# Patient Record
Sex: FEMALE | Race: ASIAN | NOT HISPANIC OR LATINO | Employment: OTHER | URBAN - METROPOLITAN AREA
[De-identification: names, ages, dates, MRNs, and addresses within clinical notes are randomized per-mention and may not be internally consistent; named-entity substitution may affect disease eponyms.]

---

## 2017-06-05 ENCOUNTER — OFFICE VISIT (OUTPATIENT)
Dept: FAMILY MEDICINE | Facility: CLINIC | Age: 72
End: 2017-06-05
Payer: COMMERCIAL

## 2017-06-05 DIAGNOSIS — R53.83 FATIGUE, UNSPECIFIED TYPE: ICD-10-CM

## 2017-06-05 DIAGNOSIS — M62.81 GENERALIZED MUSCLE WEAKNESS: ICD-10-CM

## 2017-06-05 DIAGNOSIS — Z13.6 SCREENING, ISCHEMIC HEART DISEASE: ICD-10-CM

## 2017-06-05 DIAGNOSIS — Z00.00 ROUTINE GENERAL MEDICAL EXAMINATION AT A HEALTH CARE FACILITY: Primary | ICD-10-CM

## 2017-06-05 DIAGNOSIS — E55.9 VITAMIN D DEFICIENCY: ICD-10-CM

## 2017-06-05 LAB
ERYTHROCYTE [DISTWIDTH] IN BLOOD BY AUTOMATED COUNT: 11.8 % (ref 10–15)
HBA1C MFR BLD: 5.9 % (ref 4.3–6)
HCT VFR BLD AUTO: 41.6 % (ref 35–47)
HGB BLD-MCNC: 13.7 G/DL (ref 11.7–15.7)
MCH RBC QN AUTO: 31.1 PG (ref 26.5–33)
MCHC RBC AUTO-ENTMCNC: 32.9 G/DL (ref 31.5–36.5)
MCV RBC AUTO: 95 FL (ref 78–100)
PLATELET # BLD AUTO: 231 10E9/L (ref 150–450)
RBC # BLD AUTO: 4.4 10E12/L (ref 3.8–5.2)
WBC # BLD AUTO: 8 10E9/L (ref 4–11)

## 2017-06-05 PROCEDURE — 85027 COMPLETE CBC AUTOMATED: CPT | Performed by: FAMILY MEDICINE

## 2017-06-05 PROCEDURE — 84443 ASSAY THYROID STIM HORMONE: CPT | Performed by: FAMILY MEDICINE

## 2017-06-05 PROCEDURE — 83036 HEMOGLOBIN GLYCOSYLATED A1C: CPT | Performed by: FAMILY MEDICINE

## 2017-06-05 PROCEDURE — 93000 ELECTROCARDIOGRAM COMPLETE: CPT | Performed by: FAMILY MEDICINE

## 2017-06-05 PROCEDURE — 82306 VITAMIN D 25 HYDROXY: CPT | Performed by: FAMILY MEDICINE

## 2017-06-05 PROCEDURE — 36415 COLL VENOUS BLD VENIPUNCTURE: CPT | Performed by: FAMILY MEDICINE

## 2017-06-05 PROCEDURE — 99213 OFFICE O/P EST LOW 20 MIN: CPT | Mod: 25 | Performed by: FAMILY MEDICINE

## 2017-06-05 PROCEDURE — 80061 LIPID PANEL: CPT | Performed by: FAMILY MEDICINE

## 2017-06-05 PROCEDURE — 80053 COMPREHEN METABOLIC PANEL: CPT | Performed by: FAMILY MEDICINE

## 2017-06-05 PROCEDURE — 84439 ASSAY OF FREE THYROXINE: CPT | Performed by: FAMILY MEDICINE

## 2017-06-05 PROCEDURE — 99387 INIT PM E/M NEW PAT 65+ YRS: CPT | Performed by: FAMILY MEDICINE

## 2017-06-05 RX ORDER — HYDROCODONE BITARTRATE AND ACETAMINOPHEN 5; 325 MG/1; MG/1
TABLET ORAL
Refills: 0 | COMMUNITY
Start: 2017-04-21 | End: 2021-11-01

## 2017-06-05 RX ORDER — LORATADINE 10 MG/1
10 TABLET ORAL DAILY
COMMUNITY
End: 2021-11-01

## 2017-06-05 RX ORDER — IBUPROFEN 600 MG/1
600 TABLET, FILM COATED ORAL EVERY 6 HOURS PRN
COMMUNITY
End: 2021-11-01

## 2017-06-05 NOTE — LETTER
[facility]   [address], [place]  [address]  [phone]      June 9, 2017      Rosalie Carreno  [address] [place] [address]              Dear Ms. Carreno,    The blood test results are normal for thyroid, blood cell counts, electrolytes (sodium, potassium, calcium), liver function, kidney function, and your cholesterol.     The A1c test for diabetes is mildly elevated, which means that you are pre-diabetic and have an increased risk of developing diabetes in the future.  This blood test should be repeated within one year to monitor.     Your vitamin D level was quite low; this can cause a variety of symptoms, including fatigue, muscle and joint aches and weakness.  I am not sure whether this is the only cause of the symptoms you have been having, but it would make sense to replace the vitamin D and see if your symptoms improve.  I have sent in a high dose vitamin D supplement which is taken only once per week for 8 weeks.  After that time, I would suggest coming back in to the clinic to recheck your symptoms and repeat the vitamin D blood test.     Results for orders placed or performed in visit on 06/05/17   Comprehensive metabolic panel   Result Value Ref Range    Sodium 141 133 - 144 mmol/L    Potassium 3.9 3.4 - 5.3 mmol/L    Chloride 108 94 - 109 mmol/L    Carbon Dioxide 23 20 - 32 mmol/L    Anion Gap 10 3 - 14 mmol/L    Glucose 108 (H) 70 - 99 mg/dL    Urea Nitrogen 21 7 - 30 mg/dL    Creatinine 0.70 0.52 - 1.04 mg/dL    GFR Estimate 82 >60 mL/min/1.7m2    GFR Estimate If Black >90   GFR Calc   >60 mL/min/1.7m2    Calcium 8.9 8.5 - 10.1 mg/dL    Bilirubin Total 0.3 0.2 - 1.3 mg/dL    Albumin 3.8 3.4 - 5.0 g/dL    Protein Total 7.4 6.8 - 8.8 g/dL    Alkaline Phosphatase 82 40 - 150 U/L    ALT 36 0 - 50 U/L    AST 23 0 - 45 U/L   Hemoglobin A1c   Result Value Ref Range    Hemoglobin A1C 5.9 4.3 - 6.0 %   Lipid Profile   Result Value Ref Range    Cholesterol 172  <200 mg/dL    Triglycerides 113 <150 mg/dL    HDL Cholesterol 59 >49 mg/dL    LDL Cholesterol Calculated 90 <100 mg/dL    Non HDL Cholesterol 113 <130 mg/dL   CBC with platelets   Result Value Ref Range    WBC 8.0 4.0 - 11.0 10e9/L    RBC Count 4.40 3.8 - 5.2 10e12/L    Hemoglobin 13.7 11.7 - 15.7 g/dL    Hematocrit 41.6 35.0 - 47.0 %    MCV 95 78 - 100 fl    MCH 31.1 26.5 - 33.0 pg    MCHC 32.9 31.5 - 36.5 g/dL    RDW 11.8 10.0 - 15.0 %    Platelet Count 231 150 - 450 10e9/L   TSH   Result Value Ref Range    TSH 0.82 0.40 - 4.00 mU/L   T4, free   Result Value Ref Range    T4 Free 0.87 0.76 - 1.46 ng/dL   Vitamin D Deficiency   Result Value Ref Range    Vitamin D Deficiency screening 13 (L) 20 - 75 ug/L           Sincerely,    Annabel Russell MD/yony

## 2017-06-05 NOTE — PROGRESS NOTES
SUBJECTIVE:                                                            Rosalie Carreno is a 72 year old female who presents for Preventive Visit.  Are you in the first 12 months of your Medicare coverage?  No    Physical   Annual:     Getting at least 3 servings of Calcium per day::  Yes    Bi-annual eye exam::  NO    Dental care twice a year::  NO    Sleep apnea or symptoms of sleep apnea::  Daytime drowsiness    Diet::  Regular (no restrictions)    Frequency of exercise::  None    Taking medications regularly::  No    Barriers to taking medications::  Side effects    Medication side effects::  Muscle aches and Lightheadedness    Additional concerns today::  YES (Medication side effects: fatigue)    The patient is here with her granddaughter and an .  She would like a physical, because she has been feeling very fatigued lately and generally weak.  She denies any past medical history other than h/o c section, h/o shoulder and knee pain, and a recent carpal tunnel surgery one month ago.  She lives alone and still drives.  She feels that she is forgetting people's names more often, but no confusion or other worrisome memory loss.  She reports poor sleep.  She sometimes takes naps.  In 2012, she had some episodes of weakness that were profound.  She says she was at the grocery store when she felt very weak on both sides of her body and had to call a family member to come and get her, because she did not feel she could safely drive.  This episode lasted about 2 hours.  She did not seek medical attention.      Shoulder and knee pain: bilaterally, has had steroid injections with orthopedics which does help.  The medication that she takes makes her feel tired.  I think that she is referring to ibuprofen here; norco is on her list, but I think this was just given to her post-op for carpal tunnel, and she says she only took it a few times.      CV: no h/o heart disease, no chest pain or palpitations, no  edema.  Malignancy: one son had liver cancer, another son had skin cancer.  She had a mammogram two years ago and is due for repeat  She doesn't know about colon cancer screening.  She has been a non-smoker.    Bone health: not sure if she has had dexa scan  Immunizations:  tdap done 2009 p[er [facility].  Pneumovax done 2009; I don't see a PCV13.or zoster vaccination. I will need to get her records.        COGNITIVE SCREEN  1) Repeat 3 items (Banana, Sunrise, Chair)    2) Clock draw: ABNORMAL   3) 3 item recall: Recalls 1 object   Results: ABNORMAL clock, 1-2 items recalled: PROBABLE COGNITIVE IMPAIRMENT, **INFORM PROVIDER**    Mini-CogTM Copyright RODNEY Manuel. Licensed by the author for use in [facility] Online Dealer; reprinted with permission ([email or web address]@[email or web address]). All rights reserved.        Reviewed and updated as needed this visit by clinical staff  Tobacco  Allergies  Meds  Med Hx  Surg Hx  Fam Hx  Soc Hx        Reviewed and updated as needed this visit by Provider        Social History   Substance Use Topics     Smoking status: Never Smoker     Smokeless tobacco: Not on file     Alcohol use No       The patient does not drink >3 drinks per day nor >7 drinks per week.        Today's PHQ-2 Score:   PHQ-2 ( 1999 [employer]) 6/5/2017   Q1: Little interest or pleasure in doing things 0   Q2: Feeling down, depressed or hopeless 0   PHQ-2 Score 0   Q1: Little interest or pleasure in doing things Not at all   Q2: Feeling down, depressed or hopeless Not at all   PHQ-2 Score 0       Do you feel safe in your environment - Yes    Do you have a Health Care Directive?: No: Advance care planning was reviewed with patient; patient declined at this time.    Current providers sharing in care for this patient include:   No care team member to display      Hearing impairment: No    Ability to successfully perform activities of daily living: No, needs assistance with: transportation and housework     Fall risk:  Fallen 2 or more times in the  "past year?: No  Any fall with injury in the past year?: No    Home safety:  none identified      The following health maintenance items are reviewed in Epic and correct as of today:  Health Maintenance   Topic Date Due     TETANUS IMMUNIZATION (SYSTEM ASSIGNED)  04/20/1961     ADVANCE DIRECTIVE PLANNING Q5 YRS  04/20/1961     MAMMO SCREEN Q2 YR (SYSTEM ASSIGNED)  04/20/1983     LIPID SCREEN Q5 YR FEMALE (SYSTEM ASSIGNED)  04/20/1988     COLON CANCER SCREEN (SYSTEM ASSIGNED)  04/20/1993     FALL RISK ASSESSMENT  04/20/2008     DEXA SCAN SCREENING (SYSTEM ASSIGNED)  04/20/2008     PNEUMOCOCCAL (1 of 2 - PCV13) 04/20/2008     INFLUENZA VACCINE (SYSTEM ASSIGNED)  09/01/2017              ROS:  Constitutional, HEENT, cardiovascular, pulmonary, GI, , musculoskeletal, neuro, skin, endocrine and psych systems are negative, except as otherwise noted.    There is no problem list on file for this patient.    History reviewed. No pertinent surgical history.    Social History   Substance Use Topics     Smoking status: Never Smoker     Smokeless tobacco: Not on file     Alcohol use No     History reviewed. No pertinent family history.      Current Outpatient Prescriptions   Medication Sig Dispense Refill     ibuprofen (ADVIL/MOTRIN) 600 MG tablet Take 600 mg by mouth every 6 hours as needed for moderate pain       loratadine (CLARITIN) 10 MG tablet Take 10 mg by mouth daily       HYDROcodone-acetaminophen (NORCO) 5-325 MG per tablet TK 1-2 T PO Q 4-6 H PRN  0     No Known Allergies  OBJECTIVE:                                                            /62  Temp 96.5  F (35.8  C) (Tympanic)  Resp 16  Ht 4' 8\" (1.422 m)  Wt 114 lb 9.6 oz (52 kg)  SpO2 97%  BMI 25.69 kg/m2 There is no height or weight on file to calculate BMI.  EXAM:   GENERAL APPEARANCE: healthy, alert and no distress  EYES: Eyes grossly normal to inspection, PERRL and conjunctivae and sclerae normal  HENT: ear canals and TM's normal, nose and mouth " without ulcers or lesions, oropharynx clear and oral mucous membranes moist  NECK: no adenopathy, no asymmetry, masses, or scars and thyroid normal to palpation  RESP: lungs clear to auscultation - no rales, rhonchi or wheezes  BREAST: normal without masses, tenderness or nipple discharge and no palpable axillary masses or adenopathy  CV: regular rate and rhythm, normal S1 S2, no S3 or S4, no murmur, click or rub, no peripheral edema and peripheral pulses strong  ABDOMEN: soft, nontender, no hepatosplenomegaly, no masses and bowel sounds normal  MS: no musculoskeletal defects are noted and gait is age appropriate without ataxia  SKIN: no suspicious lesions or rashes  NEURO: Normal strength and tone, sensory exam grossly normal, mentation intact and speech normal  PSYCH: mentation appears normal and affect normal/bright    ASSESSMENT / PLAN:                                                            1. Well adult    - Lipid Profile    CV: lipids and A1c, EKG a little abnormal as above, could consider further evaluation with an echocardiogram and/or stress testing.   Malignancy: she will schedule her mammogram.  I will check her records regarding colon cancer screening.   Immunizations: I will review her records for PCV 13 and zoster.  Bone health: I will review her records for previous dexa scan.     2.  Fatigue, unspecified type  The differential is wide, of course, and the history she provided was somewhat vague.  She reports ongoing fatigue and a h/o episodes of generalized muscle weakness.  I will check routine labs, including her renal and liver function tests, her blood counts, her thyroid tests, and an A1c to evaluate for diabetes.  She reports poor sleep, and I think there may be some cognitive impairment underlying by her abnormal clock drawing and her own report of having trouble with names.  She reports poor sleep, so of course insomnia would cause daytime fatigue.  Heart disease may be underlying this as  well, so I did get an EKG which showed NSR, no pathologic Q waves, but some T wave inversion in V1, V2.    - Comprehensive metabolic panel  - Hemoglobin A1c  - CBC with platelets  - TSH  - T4, free  - EKG 12-lead complete w/read - Clinics    3. Generalized muscle weakness  As above; she describes having discrete episodes of severe weakness, but this was five years ago.  Given that it was symmetric, I do not suspect TIA or stroke.    - Comprehensive metabolic panel  - Hemoglobin A1c  - CBC with platelets  - TSH  - T4, free  -Vit D  - EKG 12-lead complete w/read - Clinics      End of Life Planning:  Patient currently has an advanced directive:we did not have time to address this today.   COUNSELING:  As above.        There is no height or weight on file to calculate BMI.     reports that she has never smoked. She does not have any smokeless tobacco history on file.      Appropriate preventive services were discussed with this patient, including applicable screening as appropriate for cardiovascular disease, diabetes, osteopenia/osteoporosis, and glaucoma.  As appropriate for age/gender, discussed screening for colorectal cancer, prostate cancer, breast cancer, and cervical cancer. Checklist reviewing preventive services available has been given to the patient.    Reviewed patients plan of care and provided an AVS. The Basic Care Plan (routine screening as documented in Health Maintenance) for Rosalie meets the Care Plan requirement. This Care Plan has been established and reviewed with the Patient and other:granddaughter.    Counseling Resources:  ATP IV Guidelines  Pooled Cohorts Equation Calculator  Breast Cancer Risk Calculator  FRAX Risk Assessment  ICSI Preventive Guidelines  Dietary Guidelines for Americans, 2010  [employer] MyPlate  ASA Prophylaxis  Lung CA Screening    Annabel Russell MD  [facility]  Answers for HPI/ROS submitted by the patient on 6/5/2017   PHQ-2 Score: 0

## 2017-06-05 NOTE — NURSING NOTE
"Chief Complaint   Patient presents with     Medicare Visit       Initial /62  Temp 96.5  F (35.8  C) (Tympanic)  Resp 16  Ht 4' 8\" (1.422 m)  Wt 114 lb 9.6 oz (52 kg)  SpO2 97%  BMI 25.69 kg/m2 Estimated body mass index is 25.69 kg/(m^2) as calculated from the following:    Height as of this encounter: 4' 8\" (1.422 m).    Weight as of this encounter: 114 lb 9.6 oz (52 kg).  Medication Reconciliation: unable or not appropriate to perform         Shahriar Byrne MA      "

## 2017-06-05 NOTE — MR AVS SNAPSHOT
After Visit Summary   6/5/2017    Rosalie Carreno    MRN: [id number]           Patient Information     Date Of Birth          1945        Visit Information        Provider Department      6/5/2017 1:30 PM Annabel Russell MD; [employer] [facility]        Today's Diagnoses     Fatigue, unspecified type    -  1    Routine general medical examination at a health care facility        Generalized muscle weakness        Screening, ischemic heart disease          Care Instructions      Preventive Health Recommendations    Female Ages 65 +    Yearly exam:     See your health care provider every year in order to  o Review health changes.   o Discuss preventive care.    o Review your medicines if your doctor has prescribed any.      You no longer need a yearly Pap test unless you've had an abnormal Pap test in the past 10 years. If you have vaginal symptoms, such as bleeding or discharge, be sure to talk with your provider about a Pap test.      Every 1 to 2 years, have a mammogram.  If you are over 69, talk with your health care provider about whether or not you want to continue having screening mammograms.      Every 10 years, have a colonoscopy. Or, have a yearly FIT test (stool test). These exams will check for colon cancer.       Have a cholesterol test every 5 years, or more often if your doctor advises it.       Have a diabetes test (fasting glucose) every three years. If you are at risk for diabetes, you should have this test more often.       At age 65, have a bone density scan (DEXA) to check for osteoporosis (brittle bone disease).    Shots:    Get a flu shot each year.    Get a tetanus shot every 10 years.    Talk to your doctor about your pneumonia vaccines. There are now two you should receive - Pneumovax (PPSV 23) and Prevnar (PCV 13).    Talk to your doctor about the shingles vaccine.    Talk to your doctor about the hepatitis B vaccine.    Nutrition:  "    Eat at least 5 servings of fruits and vegetables each day.      Eat whole-grain bread, whole-wheat pasta and brown rice instead of white grains and rice.      Talk to your provider about Calcium and Vitamin D.     Lifestyle    Exercise at least 150 minutes a week (30 minutes a day, 5 days a week). This will help you control your weight and prevent disease.      Limit alcohol to one drink per day.      No smoking.       Wear sunscreen to prevent skin cancer.       See your dentist twice a year for an exam and cleaning.      See your eye doctor every 1 to 2 years to screen for conditions such as glaucoma, macular degeneration and cataracts.          Follow-ups after your visit        Who to contact     If you have questions or need follow up information about today's clinic visit or your schedule please contact [facility] directly at [phone].  Normal or non-critical lab and imaging results will be communicated to you by Easy[employer], letter or phone within 4 business days after the clinic has received the results. If you do not hear from us within 7 days, please contact the clinic through [employer] or phone. If you have a critical or abnormal lab result, we will notify you by phone as soon as possible.  Submit refill requests through [employer] or call your pharmacy and they will forward the refill request to us. Please allow 3 business days for your refill to be completed.          Additional Information About Your Visit        [employer] Information     [employer] lets you send messages to your doctor, view your test results, renew your prescriptions, schedule appointments and more. To sign up, go to [email or web address] . Click on \"Log in\" on the left side of the screen, which will take you to the Welcome page. Then click on \"Sign up Now\" on the right side of the page.     You will be asked to enter the access code listed below, as well as some personal information. Please follow the directions to " "create your username and password.     Your access code is: [email or web address]  Expires: 9/3/2017  4:51 PM     Your access code will  in 90 days. If you need help or a new code, please call your [facility] or [phone].        Care EveryWhere ID     This is your Care EveryWhere ID. This could be used by other organizations to access your [facility] medical records  [id number]        Your Vitals Were     Temperature Respirations Height Pulse Oximetry BMI (Body Mass Index)       96.5  F (35.8  C) (Tympanic) 16 4' 8\" (1.422 m) 97% 25.69 kg/m2        Blood Pressure from Last 3 Encounters:   17 129/62    Weight from Last 3 Encounters:   17 114 lb 9.6 oz (52 kg)              We Performed the Following     CBC with platelets     Comprehensive metabolic panel     EKG 12-lead complete w/read - Clinics     Hemoglobin A1c     Lipid Profile     T4, free     TSH     Vitamin D Deficiency        Primary Care Provider    None Specified       No primary provider on file.        Thank you!     Thank you for choosing [facility]  for your care. Our goal is always to provide you with excellent care. Hearing back from our patients is one way we can continue to improve our services. Please take a few minutes to complete the written survey that you may receive in the mail after your visit with us. Thank you!             Your Updated Medication List - Protect others around you: Learn how to safely use, store and throw away your medicines at www.disposemymeds.org.          This list is accurate as of: 17  4:51 PM.  Always use your most recent med list.                   Brand Name Dispense Instructions for use    HYDROcodone-acetaminophen 5-325 MG per tablet    NORCO     TK 1-2 T PO Q 4-6 H PRN       ibuprofen 600 MG tablet    ADVIL/MOTRIN     Take 600 mg by mouth every 6 hours as needed for moderate pain       loratadine 10 MG tablet    CLARITIN     Take 10 mg by mouth daily         "

## 2017-06-06 VITALS
HEIGHT: 56 IN | HEART RATE: 134 BPM | OXYGEN SATURATION: 97 % | WEIGHT: 114.6 LBS | BODY MASS INDEX: 25.78 KG/M2 | DIASTOLIC BLOOD PRESSURE: 62 MMHG | RESPIRATION RATE: 16 BRPM | SYSTOLIC BLOOD PRESSURE: 129 MMHG | TEMPERATURE: 96.5 F

## 2017-06-06 LAB
ALBUMIN SERPL-MCNC: 3.8 G/DL (ref 3.4–5)
ALP SERPL-CCNC: 82 U/L (ref 40–150)
ALT SERPL W P-5'-P-CCNC: 36 U/L (ref 0–50)
ANION GAP SERPL CALCULATED.3IONS-SCNC: 10 MMOL/L (ref 3–14)
AST SERPL W P-5'-P-CCNC: 23 U/L (ref 0–45)
BILIRUB SERPL-MCNC: 0.3 MG/DL (ref 0.2–1.3)
BUN SERPL-MCNC: 21 MG/DL (ref 7–30)
CALCIUM SERPL-MCNC: 8.9 MG/DL (ref 8.5–10.1)
CHLORIDE SERPL-SCNC: 108 MMOL/L (ref 94–109)
CHOLEST SERPL-MCNC: 172 MG/DL
CO2 SERPL-SCNC: 23 MMOL/L (ref 20–32)
CREAT SERPL-MCNC: 0.7 MG/DL (ref 0.52–1.04)
DEPRECATED CALCIDIOL+CALCIFEROL SERPL-MC: 13 UG/L (ref 20–75)
GFR SERPL CREATININE-BSD FRML MDRD: 82 ML/MIN/1.7M2
GLUCOSE SERPL-MCNC: 108 MG/DL (ref 70–99)
HDLC SERPL-MCNC: 59 MG/DL
LDLC SERPL CALC-MCNC: 90 MG/DL
NONHDLC SERPL-MCNC: 113 MG/DL
POTASSIUM SERPL-SCNC: 3.9 MMOL/L (ref 3.4–5.3)
PROT SERPL-MCNC: 7.4 G/DL (ref 6.8–8.8)
SODIUM SERPL-SCNC: 141 MMOL/L (ref 133–144)
T4 FREE SERPL-MCNC: 0.87 NG/DL (ref 0.76–1.46)
TRIGL SERPL-MCNC: 113 MG/DL
TSH SERPL DL<=0.05 MIU/L-ACNC: 0.82 MU/L (ref 0.4–4)

## 2018-08-15 ENCOUNTER — RECORDS - HEALTHEAST (OUTPATIENT)
Dept: ADMINISTRATIVE | Facility: OTHER | Age: 73
End: 2018-08-15

## 2018-09-11 ENCOUNTER — COMMUNICATION - HEALTHEAST (OUTPATIENT)
Dept: VASCULAR SURGERY | Facility: CLINIC | Age: 73
End: 2018-09-11

## 2019-07-02 ENCOUNTER — OFFICE VISIT - HEALTHEAST (OUTPATIENT)
Dept: FAMILY MEDICINE | Facility: CLINIC | Age: 74
End: 2019-07-02

## 2019-07-02 DIAGNOSIS — R09.89 RHONCHUS: ICD-10-CM

## 2019-07-02 RX ORDER — CODEINE PHOSPHATE AND GUAIFENESIN 10; 100 MG/5ML; MG/5ML
SOLUTION ORAL
Refills: 0 | Status: SHIPPED | COMMUNITY
Start: 2018-10-16 | End: 2021-10-18

## 2019-07-02 RX ORDER — ALBUTEROL SULFATE 0.83 MG/ML
2.5 SOLUTION RESPIRATORY (INHALATION) EVERY 4 HOURS PRN
Qty: 30 VIAL | Refills: 1 | Status: SHIPPED | OUTPATIENT
Start: 2019-07-02 | End: 2021-10-18

## 2019-07-05 ENCOUNTER — OFFICE VISIT - HEALTHEAST (OUTPATIENT)
Dept: FAMILY MEDICINE | Facility: CLINIC | Age: 74
End: 2019-07-05

## 2019-07-05 DIAGNOSIS — R05.9 COUGH: ICD-10-CM

## 2019-07-05 ASSESSMENT — MIFFLIN-ST. JEOR: SCORE: 865.99

## 2021-05-30 NOTE — PROGRESS NOTES
"Assessment/Plan:        Problem List Items Addressed This Visit     None      Visit Diagnoses     Cough    -  Primary      Doing well on the current plan  Continue with the nebs as needed       Follow-up PRN      Subjective:    Patient ID:   Rosalie Carreno is a 74 y.o. female comes in follow-up to walk-in care seen on 2019 for a cough.  She had a negative chest x-ray and was treated with prednisone and albuterol nebs.  She reports to doing better and having no other issues or concerns      Review of Systems  Allergy: reviewed  General : negative  A complete 5 point review of systems was obtained and is negative other than what is stated in the HPI.       The following patient's history were reviewed and updated as appropriate:   She  has no past medical history on file..      Outpatient Encounter Medications as of 2019   Medication Sig Dispense Refill     8 HOUR PAIN RELIEVER 650 mg CR tablet Take 650 mg by mouth 3 (three) times a day as needed.  0     albuterol (PROVENTIL) 2.5 mg /3 mL (0.083 %) nebulizer solution Take 3 mL (2.5 mg total) by nebulization every 4 (four) hours as needed for wheezing. 30 vial 1     codeine-guaiFENesin (GUAIFENESIN AC)  mg/5 mL liquid TAKE 5 ML BY MOUTH EVERY 8 HOURS AS NEEDED  0     [] predniSONE (DELTASONE) 20 MG tablet Take 40 mg by mouth daily for 5 days. 10 tablet 0     No facility-administered encounter medications on file as of 2019.          Objective:   /60 (Patient Site: Right Arm, Patient Position: Sitting, Cuff Size: Adult Regular)   Pulse 69   Ht 4' 9\" (1.448 m)   Wt 112 lb 14.4 oz (51.2 kg)   SpO2 95%   BMI 24.43 kg/m        Physical Exam    General appearance - alert, well appearing, and in no distress  Neck - supple, no significant adenopathy, no palpable mass  Chest - clear to auscultation, no wheezes, rales or rhonchi, symmetric air entry  Heart - normal rate, regular rhythm, normal S1, S2, no murmurs, rubs, clicks or gallops  Back " exam -  non tenderness, no palpable spasm or pain on motion  Skin - normal coloration and turgor, no rashes, no suspicious skin lesions noted

## 2021-05-30 NOTE — PROGRESS NOTES
Chief Complaint   Patient presents with     Cough     dry cough, x3 days per daughter of pt, hoarse voice, been taking some cough syrup with codiene that makes her dizzy        HPI:  Rosalie Carreno is a 74 y.o. female who presents today complaining of dry cough, fatigue, and dizziness for the past 3 days. She is also having voice hoarseness. She has been taking cough syrup with codeine. Upon the rooming process the MA noted an abnormal breathing pattern like a jerking chest motion. The movement is intermittent. The MA asked the patient's daughter if the pattern has been noted before, and the daughter states that she just noticed it for the first time now that it's been pointed out.     History obtained from the patient.    Problem List:  Cervical Spine Stenosis      No past medical history on file.    Social History     Tobacco Use     Smoking status: Never Smoker     Smokeless tobacco: Never Used   Substance Use Topics     Alcohol use: Not on file       Review of Systems   HENT: Positive for voice change (hoarse).    Respiratory: Positive for cough and wheezing.        Vitals:    07/02/19 0732   BP: 115/71   Patient Site: Right Arm   Patient Position: Sitting   Cuff Size: Adult Regular   Pulse: 70   Resp: 15   Temp: 98.2  F (36.8  C)   TempSrc: Oral   SpO2: 96%   Weight: 114 lb 4 oz (51.8 kg)       Physical Exam   Constitutional: She appears well-developed and well-nourished. No distress.   HENT:   Head: Normocephalic and atraumatic.   Right Ear: External ear normal.   Left Ear: External ear normal.   Eyes: Conjunctivae are normal. Right eye exhibits no discharge. Left eye exhibits no discharge.   Cardiovascular: Normal rate, regular rhythm and normal heart sounds.   Pulmonary/Chest: No accessory muscle usage. No tachypnea. She has wheezes. She has rhonchi (expiratory) in the right upper field and the left upper field.   Jerking chest movement that occurs intermittently. Could possibly be a grunting, or excess effort  to .    Skin: She is not diaphoretic.   Psychiatric: She has a normal mood and affect. Her behavior is normal. Judgment and thought content normal.         Radiology:  I have personally ordered and preliminarily reviewed the following xray, I have noted no signs of consolidation.  Xr Chest 2 Views    Result Date: 2019  EXAM DATE:         2019 EXAM: X-RAY CHEST, 2 VIEWS, FRONTAL AND LATERAL LOCATION: [facility] DATE/TIME: 2019 7:45 AM INDICATION: expiratory rhonchi bilateral upper lung fields. Abnormal breathing pattern COMPARISON: 2009. FINDINGS: The lungs are clear of focal consolidation. No pneumothorax or pleural effusion. Heart size is upper normal. Pulmonary vascularity is normal.       Clinical Decision Making:  Experiencing expiratory rhonchus and wheeze.  Chest x-ray was clear for any signs of pneumonia.  Patient has no known history for tobacco use, but she improved significantly with DuoNeb here in the clinic.  After the DuoNeb her breathing effort was improved.  May have component of reactive airway disease.  Recommend that we start on albuterol and prednisone with close follow-up with primary care.  Patient's vitals remained stable even with walking test.  At the end of the encounter, I discussed results, diagnosis, medications. Discussed red flags for immediate return to clinic/ER, as well as indications for follow up if no improvement. Patient understood and agreed to plan. Patient was stable for discharge.    1. Rhonchus  XR Chest 2 Views    ipratropium-albuterol 0.5-2.5 mg/3 mL nebulizer solution 3 mL (DUO-NEB)         Patient Instructions   1. Discontinue the codeine cough syrup. Begin taking Delsym and Tessalon Perles.   2. Increase fluids and rest.   3. Please monitor symptoms carefully.  If developing chest pain, shortness of breath, fever, coughing up blood, extreme fatigue, or any other new, concerning symptoms, come back to clinic or go to ER  immediately.  Otherwise, if no improvement in symptoms in one week, follow-up with your primary care provider.

## 2021-05-30 NOTE — PATIENT INSTRUCTIONS - HE
1. Discontinue the codeine cough syrup. Begin taking Prednisone and use breathing treatments every 6 hours for the next 3 days.   2. Increase fluids and rest.   3. Please monitor symptoms carefully.  If developing chest pain, worsening shortness of breath, fever, coughing up blood, extreme fatigue, or any other new, concerning symptoms, come back to clinic or go to ER immediately.  Otherwise, if no improvement in symptoms in one week, follow-up with your primary care provider.

## 2021-06-01 ENCOUNTER — RECORDS - HEALTHEAST (OUTPATIENT)
Dept: ADMINISTRATIVE | Facility: CLINIC | Age: 76
End: 2021-06-01

## 2021-06-03 VITALS — WEIGHT: 112.9 LBS | BODY MASS INDEX: 24.36 KG/M2 | HEIGHT: 57 IN

## 2021-06-03 VITALS — WEIGHT: 114.25 LBS

## 2021-06-04 ENCOUNTER — RECORDS - HEALTHEAST (OUTPATIENT)
Dept: ADMINISTRATIVE | Facility: CLINIC | Age: 76
End: 2021-06-04

## 2021-08-22 ENCOUNTER — HEALTH MAINTENANCE LETTER (OUTPATIENT)
Age: 76
End: 2021-08-22

## 2021-10-17 ENCOUNTER — HEALTH MAINTENANCE LETTER (OUTPATIENT)
Age: 76
End: 2021-10-17

## 2021-10-18 ENCOUNTER — OFFICE VISIT (OUTPATIENT)
Dept: FAMILY MEDICINE | Facility: CLINIC | Age: 76
End: 2021-10-18
Payer: COMMERCIAL

## 2021-10-18 VITALS
OXYGEN SATURATION: 96 % | SYSTOLIC BLOOD PRESSURE: 135 MMHG | BODY MASS INDEX: 25.46 KG/M2 | TEMPERATURE: 97.6 F | DIASTOLIC BLOOD PRESSURE: 75 MMHG | WEIGHT: 118 LBS | HEIGHT: 57 IN | RESPIRATION RATE: 22 BRPM | HEART RATE: 74 BPM

## 2021-10-18 DIAGNOSIS — Z11.59 NEED FOR HEPATITIS C SCREENING TEST: ICD-10-CM

## 2021-10-18 DIAGNOSIS — E66.3 OVERWEIGHT (BMI 25.0-29.9): ICD-10-CM

## 2021-10-18 DIAGNOSIS — Z23 NEED FOR PROPHYLACTIC VACCINATION AND INOCULATION AGAINST INFLUENZA: ICD-10-CM

## 2021-10-18 DIAGNOSIS — Z13.220 LIPID SCREENING: ICD-10-CM

## 2021-10-18 DIAGNOSIS — Z76.89 ENCOUNTER TO ESTABLISH CARE: Primary | ICD-10-CM

## 2021-10-18 LAB
ANION GAP SERPL CALCULATED.3IONS-SCNC: 12 MMOL/L (ref 5–18)
BUN SERPL-MCNC: 14 MG/DL (ref 8–28)
CALCIUM SERPL-MCNC: 9.7 MG/DL (ref 8.5–10.5)
CHLORIDE BLD-SCNC: 106 MMOL/L (ref 98–107)
CHOLEST SERPL-MCNC: 193 MG/DL
CO2 SERPL-SCNC: 24 MMOL/L (ref 22–31)
CREAT SERPL-MCNC: 0.68 MG/DL (ref 0.6–1.1)
FASTING STATUS PATIENT QL REPORTED: YES
GFR SERPL CREATININE-BSD FRML MDRD: 84 ML/MIN/1.73M2
GLUCOSE BLD-MCNC: 96 MG/DL (ref 70–125)
HDLC SERPL-MCNC: 65 MG/DL
LDLC SERPL CALC-MCNC: 111 MG/DL
POTASSIUM BLD-SCNC: 3.8 MMOL/L (ref 3.5–5)
SODIUM SERPL-SCNC: 142 MMOL/L (ref 136–145)
TRIGL SERPL-MCNC: 85 MG/DL

## 2021-10-18 PROCEDURE — 90662 IIV NO PRSV INCREASED AG IM: CPT | Performed by: FAMILY MEDICINE

## 2021-10-18 PROCEDURE — 80048 BASIC METABOLIC PNL TOTAL CA: CPT | Performed by: FAMILY MEDICINE

## 2021-10-18 PROCEDURE — 80061 LIPID PANEL: CPT | Performed by: FAMILY MEDICINE

## 2021-10-18 PROCEDURE — G0008 ADMIN INFLUENZA VIRUS VAC: HCPCS | Performed by: FAMILY MEDICINE

## 2021-10-18 PROCEDURE — 36415 COLL VENOUS BLD VENIPUNCTURE: CPT | Performed by: FAMILY MEDICINE

## 2021-10-18 PROCEDURE — 99204 OFFICE O/P NEW MOD 45 MIN: CPT | Mod: 25 | Performed by: FAMILY MEDICINE

## 2021-10-18 PROCEDURE — 86803 HEPATITIS C AB TEST: CPT | Performed by: FAMILY MEDICINE

## 2021-10-18 ASSESSMENT — MIFFLIN-ST. JEOR: SCORE: 890.5

## 2021-10-18 NOTE — PROGRESS NOTES
"CC: New patient       HPI:   - No medical conditions and no current medications that she takes   - Only complaints are back aches and muscle aches in her arms that are chronic issues   - Post carpal tunnel surgery, shares that her normal range of motion is more limited after surgery, and also endorses some numbness or tingling     Rosalie Carreno is accompanied by her daughter.    [employer]  was used for this visit    Past Medical History   - No PMH   - Received care from Dr. Kingsley Mata ([facility]), but has not been seen for 2 years  - No history of hospitalizations     Medications  - Not currently on any medications     Family History  - No family history of diabetes     Surgical History  - R knee replacement (2019)   - Surgery for Carpal Tunnel (2019)     Social History   - Lives in [place], alone; has children who live close  - Shares that due to her age she keeps herself busy with small household activities but no heavy lifting  - Hobbies include watching/listening to YouTube       Physical Exam          Physical Exam:     Vitals:    10/18/21 0818   BP: 135/75   Pulse: 74   Resp: 22   Temp: 97.6  F (36.4  C)   SpO2: 96%   Weight: 53.5 kg (118 lb)   Height: 1.45 m (4' 9.09\")     Body mass index is 25.46 kg/m .    General: Alert, well-appearing female in NAD  HEENT: PERRL, moist oral mucus membranes  Pulm: CTA BL, no tachypnea  CV: RRR, no murmur  Abd: soft, NTND, no masses  Ext: Warm, well perfused, 2+ BL radial pulses, no LE edema  Skin: No rash on limited skin exam  Psych: Mood appropriate to visit content, full affect, rational thought content and process      Assessment & Plan    1. Encounter to establish care  Ms. Rosalie Carreno is a 76 year-old female with no PMH, currently not on any medications, who comes to clinic to establish care. During today's visit we discussed preventative health screening, and took a general history of her medical care, conditions, and medications.   - Routine " screening as below.    2. Lipid screening  No lipids in the past 5 years. Will get baseline today.  - Lipid panel; Future  - Lipid panel    3. Overweight (BMI 25.0-29.9)  Body mass index is 25.46 kg/m . No FH of DM II but will obtain fasting glucose today and check baseline kidney function.  - Basic metabolic panel; Future  - Basic metabolic panel    4. Need for hepatitis C screening test  Routine screening.  - Hepatitis C antibody; Future  - Hepatitis C antibody    5. Need for prophylactic vaccination and inoculation against influenza  Seasonal immunization today.   - INFLUENZA, QUAD, HIGH DOSE, PF, 65YR + (FLUZONE HD)    > F/U with Medicare visit, already scheduled in November     Smith Skaggs  [facility]  Medical Student, MS3  0946, October 18, 2021     Pt was seen with Dr. Morrison.     Physician Attestation   I, Meredith Morrison, was present with the medical/DOE student who participated in the service and in the documentation of the note.  I have verified the history and personally performed the physical exam and medical decision making.  I agree with the assessment and plan of care as documented in the note.      Items personally reviewed: vitals and agree with the interpretation documented in the note.    Meredith Morrison MD

## 2021-10-18 NOTE — NURSING NOTE
Due to patient being non-English speaking/uses sign language, an  was used for this visit. Only for face-to-face interpretation by an external agency, date and length of interpretation can be found on the scanned worksheet.     name: Tesfaye Miles  Agency: [employer]  Language: Geina   Telephone number: [phone]  Type of interpretation: Face-to-face, spoken

## 2021-10-19 DIAGNOSIS — R76.8 POSITIVE HEPATITIS C ANTIBODY TEST: Primary | ICD-10-CM

## 2021-10-19 LAB — HCV AB SERPL QL IA: POSITIVE

## 2021-10-21 PROBLEM — R76.8 POSITIVE HEPATITIS C ANTIBODY TEST: Status: ACTIVE | Noted: 2021-10-21

## 2021-10-29 NOTE — PATIENT INSTRUCTIONS
- We are checking your blood today to see if there's any signs of a current infection with hepatitis C. We are also checking your liver. We will call with these results when they return, likely later this week.     - If you are experiencing any persistent issues with urinating, please follow-up. Make sure that you are drinking water regularly.     - Work on getting some more regular physical activity in a safe place to walk to help with your back. Please follow-up if the back pain is worsening or if you notice any weakness or pain radiating down into your leg.     - Follow-up to do the full preventive health exam in the next month or so since we did not do this today.

## 2021-11-01 ENCOUNTER — OFFICE VISIT (OUTPATIENT)
Dept: FAMILY MEDICINE | Facility: CLINIC | Age: 76
End: 2021-11-01
Payer: COMMERCIAL

## 2021-11-01 VITALS
HEIGHT: 57 IN | SYSTOLIC BLOOD PRESSURE: 121 MMHG | DIASTOLIC BLOOD PRESSURE: 79 MMHG | BODY MASS INDEX: 25.46 KG/M2 | HEART RATE: 89 BPM | RESPIRATION RATE: 18 BRPM | WEIGHT: 118 LBS | TEMPERATURE: 98.1 F | OXYGEN SATURATION: 98 %

## 2021-11-01 DIAGNOSIS — R39.198 ABNORMAL URINATION: ICD-10-CM

## 2021-11-01 DIAGNOSIS — Z23 HIGH PRIORITY FOR 2019-NCOV VACCINE: ICD-10-CM

## 2021-11-01 DIAGNOSIS — M54.50 ACUTE RIGHT-SIDED LOW BACK PAIN WITHOUT SCIATICA: ICD-10-CM

## 2021-11-01 DIAGNOSIS — R76.8 POSITIVE HEPATITIS C ANTIBODY TEST: Primary | ICD-10-CM

## 2021-11-01 DIAGNOSIS — R20.0 FINGER NUMBNESS: ICD-10-CM

## 2021-11-01 DIAGNOSIS — Z00.00 WELLNESS EXAMINATION: Primary | ICD-10-CM

## 2021-11-01 PROCEDURE — 36415 COLL VENOUS BLD VENIPUNCTURE: CPT | Performed by: FAMILY MEDICINE

## 2021-11-01 PROCEDURE — 87522 HEPATITIS C REVRS TRNSCRPJ: CPT | Performed by: FAMILY MEDICINE

## 2021-11-01 PROCEDURE — 91301 COVID-19,PF,MODERNA (18+ YRS BOOSTER .25ML): CPT | Performed by: FAMILY MEDICINE

## 2021-11-01 PROCEDURE — 0013A PR ADMIN COVID VAC MODERNA, 3RD DOSE IMM COMP PT: CPT | Performed by: FAMILY MEDICINE

## 2021-11-01 PROCEDURE — 99207 PR NO BILLABLE SERVICE THIS VISIT: CPT

## 2021-11-01 PROCEDURE — 99215 OFFICE O/P EST HI 40 MIN: CPT | Mod: 25 | Performed by: FAMILY MEDICINE

## 2021-11-01 ASSESSMENT — MIFFLIN-ST. JEOR: SCORE: 890.5

## 2021-11-01 NOTE — PROGRESS NOTES
"  Assessment & Plan     Positive hepatitis C antibody test  Positive Hep C antibody test with routine screening. No known history of infection previously in herself or a family member. HCV RNA pending. Will add on hepatic panel also.  - Hepatitis C RNA, Quantitative by PCR; Future  - Hepatitis C RNA, Quantitative by PCR  - Hepatic panel; Future    Acute right-sided low back pain without sciatica  Onset in August. No known inciting events. Improves with activity. No associated myelopathy and no sciatica.   - Encouraged conservative measures to improve pain including more regular activity, use of Tylenol as needed, and heat for relief.   - Follow-up if worsening.    Finger numbness  In the right hand in a distribution (fingertips of digits 2-5) not consistent with prior carpal tunnel surgery. Recent screening for DM II was negative. No concerning weakness.   - Will monitor for now. Further work-up with labs (B12) and EMG may be in order if progressive.     Abnormal urination  Episodic and associated with spicy foods. May be related to hydration status as well.   - Reviewed when to follow-up for more of a work-up, including any abdominal pain, persistent dysuria or difficulty starting urinary stream, and any other systemic symptoms.     High priority for 2019-nCoV vaccine  Eligible for booster. Given today.  - COVID-19,PF,MODERNA (18+ Yrs BOOSTER .25mL)    43 minutes spent on the date of the encounter doing chart review, review of test results, patient visit and documentation        BMI:   Estimated body mass index is 25.46 kg/m  as calculated from the following:    Height as of this encounter: 1.45 m (4' 9.09\").    Weight as of this encounter: 53.5 kg (118 lb).   Weight management plan: Will discuss at wellness visit    Follow-up for Medicare Wellness Visit in the next 4-6 weeks.    Meredith Morrison MD  [facility] [place] Subjective Ying is a 76 year old who presents for the following health " issues     Due to language barrier, an  was present during the history-taking and subsequent discussion (and for part of the physical exam) with this patient.    HPI     Initially scheduled for a Medicare Wellness Visit but deferred this today to address some acute needs.     1. Positive Hepatitis C antibodies on routine screening. She has never heard of this infection before and doesn't know of anyone who has had the infection in her family. She denies any issues with liver disease in her family. No personal history of abdominal pain or leg swelling.    2.  Ongoing numbness and tingling in her right wrist with certain activities like chopping meat. This happens in all of four fingertips and not her thumb. No issues with her thumb. Previous surgery of her right wrist for carpal tunnel 2 years ago and feels that the surgery helped with this issue. She denies any radiation from her neck or down her arm. She denies any weakness in her right hand. She is right hand dominant.     3. Urinary issues: She has some difficulty starting her stream at times and then it will burn when she is urinating. She's had this happen a couple of times and has noticed that it tends to happen after eating out at a restaurant or after eating spicy food. The episodes are always isolated and never persists after this. She denies any lower abdominal issues or vaginal irritation or discharge. She is moving her bowels normally.    4. Low back pain: She also reports some low back discomfort. It started in August 2021 and is in the right low back and radiates into the buttock. No pain radiating into her leg. No weakness or balance concerns. Some numbness into the buttock. Sometimes it is hard to sleep in certain positions. She is less active than normal during the pandemic and attributes the pain to less walking around and not getting enough fresh air. Movement alleviates your pain. She has tried Tylenol for this with some relief.      ROS: Some bilateral shoulder pain that doesn't interfere much with her daily. No fever/chills, changes to energy level or appetite.         Objective    /79   Pulse 89   Temp 98.1  F (36.7  C) (Oral)   Resp 18   SpO2 98%   There is no height or weight on file to calculate BMI.  Physical Exam   GENERAL: healthy, alert and no distress  RESP: lungs clear to auscultation - no rales, rhonchi or wheezes  CV: regular rate and rhythm, normal S1 S2, no S3 or S4, no murmur, click or rub, no peripheral edema and peripheral pulses strong  ABDOMEN: soft, nontender, no hepatosplenomegaly, no masses and bowel sounds normal  MS: no gross musculoskeletal defects noted, no edema  NEURO: Normal strength and tone in legs and right hand/wrist, mentation intact and speech normal. Decreased sensation in the tips of the 2nd-5th digits on the right hand.  Comprehensive back pain exam:  Tenderness of right sacroiliac joint and into gluteal, Range of motion not limited by pain, Lower extremity strength functional and equal on both sides, and Lower extremity sensation normal and equal on both sides

## 2021-11-01 NOTE — PROGRESS NOTES
Patient was not seen for Wellness visit today. Physician completed an acute visit today. RLjaelyn STANLEY

## 2021-11-01 NOTE — LETTER
November 8, 2021      Rosalie Carreno  [address] [place] [address]        Dear ,    We are writing to inform you of your test results.    Your test results fall within the expected range(s) or remain unchanged from previous results.  Please continue with current treatment plan.    Resulted Orders   Hepatitis C RNA, Quantitative by PCR   Result Value Ref Range    Hepatitis C RNA IU/mL Not Detected Not Detected IU/mL    Narrative    The CORINNA AmpliPrep/CORINNA TaqMan HCV test is a FDA-approved in vitro nucleic acid amplification test for the quantitation of HCV DNA in human plasma (EDTA plasma) or serum using the CORINNA AmpliPrep instrument for automated viral nucleic acid extraction and the CORINNA TaqMan for the automated real-time PCR amplification and detection of viral nucleic acid target. Titer results are reported in International Units/mL (IU/mL) using the 1st WHO International standard for HBV for nucleic acid amplification assays.       If you have any questions or concerns, please call the clinic at the number listed above.       Sincerely,      Meredith Morrison MD

## 2021-11-03 LAB — HCV RNA SERPL NAA+PROBE-ACNC: NOT DETECTED IU/ML

## 2021-12-13 ENCOUNTER — OFFICE VISIT (OUTPATIENT)
Dept: FAMILY MEDICINE | Facility: CLINIC | Age: 76
End: 2021-12-13
Payer: COMMERCIAL

## 2021-12-13 VITALS
HEART RATE: 71 BPM | TEMPERATURE: 99.2 F | OXYGEN SATURATION: 97 % | BODY MASS INDEX: 25.87 KG/M2 | HEIGHT: 56 IN | WEIGHT: 115 LBS

## 2021-12-13 DIAGNOSIS — H91.91 HEARING LOSS OF RIGHT EAR, UNSPECIFIED HEARING LOSS TYPE: ICD-10-CM

## 2021-12-13 DIAGNOSIS — Z00.00 ENCOUNTER FOR MEDICARE ANNUAL WELLNESS EXAM: Primary | ICD-10-CM

## 2021-12-13 DIAGNOSIS — Z66 DNR (DO NOT RESUSCITATE): ICD-10-CM

## 2021-12-13 DIAGNOSIS — E28.39 ESTROGEN DEFICIENCY: ICD-10-CM

## 2021-12-13 DIAGNOSIS — Z00.00 WELLNESS EXAMINATION: Primary | ICD-10-CM

## 2021-12-13 PROCEDURE — G0439 PPPS, SUBSEQ VISIT: HCPCS | Mod: 25 | Performed by: STUDENT IN AN ORGANIZED HEALTH CARE EDUCATION/TRAINING PROGRAM

## 2021-12-13 PROCEDURE — 99207 PR NO BILLABLE SERVICE THIS VISIT: CPT

## 2021-12-13 PROCEDURE — 90715 TDAP VACCINE 7 YRS/> IM: CPT | Performed by: STUDENT IN AN ORGANIZED HEALTH CARE EDUCATION/TRAINING PROGRAM

## 2021-12-13 PROCEDURE — 90471 IMMUNIZATION ADMIN: CPT | Performed by: STUDENT IN AN ORGANIZED HEALTH CARE EDUCATION/TRAINING PROGRAM

## 2021-12-13 ASSESSMENT — MIFFLIN-ST. JEOR: SCORE: 864.39

## 2021-12-13 NOTE — PROGRESS NOTES
Preceptor Attestation:  Patient's case reviewed and discussed with the resident, Yashira Ruano MD, and I personally evaluated the patient. I agree with written assessment and plan of care.    Supervising Physician:  Meredith Morrison MD   [facility]

## 2021-12-13 NOTE — PROGRESS NOTES
Medicare Wellness Visit  Health Risk Assessment           Health Risk Assessment / Review of Systems     Constitutional: Any fevers or night sweats? No     Eyes:  Vision problems   No, passed visual acuity screen for  licensure without corrective lenses.    Hearing Do you feel you have hearing loss?   YES - muffled hearing in right ear for past year.    Cardiovascular: Any chest pain, fast or irregular heart beat, calf pain with walking?     No           Respiratory:   Any breathing problems or cough?   No     Gastrointestinal: Any stomach or stool problems?   No      Genitourinary: Do you have difficulty controlling urination?   No     Muscles and Joints: Any joint stiffness or soreness?   No     Skin: Any concerning lesions or moles?    YES -check right shin lump and occasional tenderness feeling.    Nervous System: Any loss of strength or feeling, numbness or tingling, shaking, dizziness, or headache?  No     Mental Health: Any depression, anxiety or problems sleeping?    No     Cognition: Do you have any problems with your memory?   YES -noticed for past 1-2 years decrease in short term memory.           Medical Care     What other specialists or organizations are involved in your medical care?  NONE  Patient Care Team       Relationship Specialty Notifications Start End    Meredith Morrison MD PCP - General Family Medicine  10/12/21     Merged    Phone: [phone] Fax: [phone] [address] [place] [address]    Charline Tavarez MD Assigned PCP   6/16/21     Phone: [phone] Fax: [phone] [address] [address]    Meredith Morrison MD Assigned PCP   11/14/21     Phone: [phone] Fax: [phone]         [address] [place] [address]          Have you been to the ER or overnight in the hospital in the last year?  No          Social History / Home Safety       Marital Status: x 18 years  Who lives in your  household? self    Do you feel threatened or controlled by a partner, ex-partner or anyone in your life? No     Has anyone hurt you physically, for example by pushing, hitting, slapping or kicking you   or forcing you to have sex? No          Does your home have any of the following safety concerns; loose rugs in the hallway,  bathrooms with no grab bars by the tub or toilet, stairs with no handrails or poorly lit areas?  No     Do you need help with dressing yourself, bathing, eating or getting around your home?  No     Do you need help with the phone, transportation, shopping, preparing meals, housework, laundry, medications or managing money?No       Risk Behaviors and Healthy Habits     History   Smoking Status     Never Smoker   Smokeless Tobacco     Never Used     How many servings of fruits and vegetables do you eat a day? 3-4 servings a day    Exercise: walk once a day x 2 blocks, 4-5 times a week.     Do you frequently drive without a seatbelt? No     Do you use tobacco?  No     Do you use any other drugs? No         Do you use alcohol?No      Frailty Assessment            Have you lost 10 or more pounds unintentionally in the previous year? No     How difficult is walking from one room to the other on the same level?not       Is it difficult to lift or carry something as heavy as 10 pounds?moderately      Compared with most (men/women) your age, would you say  that you are more active, less active, or about the same? same      FALL RISK ASSESSMENT 12/13/2021 6/5/2017   Fallen 2 or more times in the past year? No No   Any fall with injury in the past year? No No   Timed Up and Go Test/Seconds (13.5 is a fall risk; contact physician) 12 -         Advance Directives:   Discussed with patient and family as appropriate. Has patient  completed advance directives and/or a living will? no      Digna Cardenas RN

## 2021-12-13 NOTE — PROGRESS NOTES
Medicare Annual Wellness Visit         HPI     This 76 year old female presents as an established patient  Meredith Morrison who presents for an subsequent Medicare Wellness Exam.    A [employer]  was used for this visit.     Patient Active Problem List   Diagnosis     Cervical Spine Stenosis     Positive hepatitis C antibody test     DNR (do not resuscitate)       Past Medical History:   Diagnosis Date     Cervical stenosis of spinal canal         Family History   Problem Relation Age of Onset     Diabetes No family hx of      Heart Disease No family hx of      Liver Cancer Son      Skin Cancer Son          Past Surgical History:   Procedure Laterality Date     CARPAL TUNNEL RELEASE RT/LT Right 2017     CARPAL TUNNEL RELEASE RT/LT Right 2019      SECTION       IR CERVICAL EPIDURAL STEROID INJECTION  2013     REPLACEMENT TOTAL KNEE Right 2019       Reviewed no other significant FH    Family History and past Medical History reviewed and it is unchanged/updated.       Review of Systems       Constitutional:   fevers, night sweats or unintentional weight change ?  NO      Eyes:   vision change, diplopia or red eyes?  NO      Ears, Nose, Mouth, Throat:   tinnitus or hearing change,  epistaxis or nasal discharge,  oral lesions, throat pain ?  NO      Neck:   stiffness?  NO           Cardiovascular:   chest pain, palpitations, or pain with walking, orthopnea or PND?  NO   Breasts:  Any bumps or unusual discharge?     NO         Respiratory:   dyspnea, cough, shortness of breath or wheezing?  NO         GI:   nausea, vomiting, diarrhea or constipation,  abdominal pain ?  NO         :   change in urine,  dysuria or hematuria,  sexual dysfunction ?  NO        Musculoskeletal:   joint or muscle pain or swelling?  NO            Skin:   concerning lesions or moles?  NO           Nervous System:   loss of strength or sensation,  numbness or tingling,  tremor,  dizziness,  headache?  NO    Endocrine/Homone:   polyuria or polydipsia,  temperature intolerance?  NO            Blood and Lymphnodes:   concerning bumps,  bleeding problems?  NO            Allergy:   environmental allergies?  NO            Mental Health:   depression or anxiety,  sleep problems?  NO               Medical Care     Have you been to an ER or a hospital in the last year? No  What other specialists or organizations are involved in your medical care?  None.   Current providers sharing in care for this patient include:  Patient Care Team:  Meredith Morrison MD as PCP - General (Family Medicine)  Charline Tavarez MD as Assigned PCP  Meredith Morrison MD as Assigned PCP         Social History     Social History     Tobacco Use     Smoking status: Never Smoker     Smokeless tobacco: Never Used   Substance Use Topics     Alcohol use: No     Marital Status:  Who lives in your household? Lives alone, in a house   Does your home have any of the following safety concerns? Loose rugs in the hallway, no grab bars in the bathroom, no handrails on the stairs or have poorly lit areas?  No  Do you feel threatened or controlled by a partner, ex-partner or anyone in your life? No  Has anyone hurt you physically, for example by pushing, hitting, slapping or kicking you   or forcing you to have sex? No  Do you need help with the phone, transportation, shopping, preparing meals, housework, laundry, medications or managing money? No   Have you noticed any hearing difficulties? Yes right side is harder to hear      Risk Behaviors and Healthy Habits     How many servings of fruits and vegetables do you eat a day? Yea  How often do you exercise and what do you do? Yes, will do small exercises at home   Do you frequently ride without a seatbelt? No  Do you use tobacco?  No  Do you use any other drugs? No         Do you use alcohol?No    Today's PHQ-2 Score:   N/A    Sexual Health     Are you sexually active?  No   Have you had  any sexually transmitted infections? No   Any sexual concerns? No     FOR WOMEN  What year did you stop having periods? When she was in her 50s  Any vaginal bleeding in the last year? No  Have you ever had an abnormal Pap smear? None - never done     FUNCTIONAL ABILITY/SAFETY SCREENING     Fall Risk Assessment Today: Fallen 2 or more times in the past year?: No  Any fall with injury in the past year?: No  Timed Up and Go Test (>13.5 is fall risk; contact physician) : 12    Hearing evaluation if done: No    EVALUATION OF COGNITIVE FUNCTION     Mood/affect:Normal  Appearance:Normal  Mini Cog   Unable to perform, patient cannot write in English  Immediate recall of 3 words, got 2 out of three      SCREENING FOR PREVENTION and EARLY DETECTION     ECG (if done)not performed    Dexa Scan (>65 yrs) (covered every 2 years):  Recommended and patient accepted testing.    CV Risk based on Pooled Cohort Risk:  The 10-year ASCVD risk score (Tray RICHARDS Jr., et al., 2013) is: 20.2%    Values used to calculate the score:      Age: 76 years      Sex: Female      Is Non- : No      Diabetic: No      Tobacco smoker: No      Systolic Blood Pressure: 121 mmHg      Is BP treated: No      HDL Cholesterol: 65 mg/dL      Total Cholesterol: 193 mg/dL    Advanced Directives: Discussed and patient desires to be DNR/DNI.      Immunization History   Administered Date(s) Administered     COVID-19,PF,Moderna 02/25/2021, 03/25/2021, 11/01/2021     HepA-Adult 04/14/2008     Influenza (High Dose) 3 valent vaccine 11/18/2016, 01/31/2018     Influenza (IIV3) PF 06/04/2009, 10/06/2009     Influenza, Quad, High Dose, Pf, 65yr+ (Fluzone HD) 10/18/2021     Pneumococcal 23 valent 06/04/2009     Poliovirus, inactivated (IPV) 06/04/2009     Td (Adult), Adsorbed 06/04/2009     Tdap (Adacel,Boostrix) 12/13/2021     Typhoid IM 04/14/2008       Reviewed Immunization Record Today  Pneumoccocal Vaccine: No  Varicella Vaccine: No  TDaP:Yes         " Physical Exam     Vitals: Pulse 71   Temp 99.2  F (37.3  C) (Oral)   Ht 1.43 m (4' 8.3\")   Wt 52.2 kg (115 lb)   SpO2 97%   BMI 25.51 kg/m    BMI= Body mass index is 25.51 kg/m .  GENERAL APPEARANCE: healthy, alert and no distress  EYES: Eyes grossly normal to inspection, PERRL and conjunctivae and sclerae normal  NECK: no adenopathy, no asymmetry, masses, or scars and thyroid normal to palpation  RESP: lungs clear to auscultation - no rales, rhonchi or wheezes  CV: regular rate and rhythm, normal S1 S2, no S3 or S4, no murmur, click or rub, no peripheral edema and peripheral pulses strong  ABDOMEN: soft, nontender, no hepatosplenomegaly, no masses and bowel sounds normal  MS: no musculoskeletal defects are noted and gait is age appropriate without ataxia  SKIN: no suspicious lesions or rashes  NEURO: Normal strength and tone, sensory exam grossly normal, mentation intact and speech normal  PSYCH: mentation appears normal and affect normal/bright        Assessment and Plan   subsequent   Medicare Wellness Exam  1. Health Care Maintenance: Normal Physical Exam  Doing very well at home and is independent at home. Feels safe at home.     PLAN:  1. Dexa Scan ordered.   2. Sent home with advanced directive paperwork to fill out with daughter    Rosalie was seen today for wellness visit.    Diagnoses and all orders for this visit:    Encounter for Medicare annual wellness exam    Hearing loss of right ear, unspecified hearing loss type  -     Adult Audiology Referral; Future    DNR (do not resuscitate)    Estrogen deficiency  -     Dexa hip/pelvis/spine*; Future    Other orders  -     TDAP VACCINE (Adacel, Boostrix)  [[id number]]          Options for treatment and follow-up care were reviewed with the Rosalie Carreno and/or guardian engaged in the decision making process and verbalized understanding of the options discussed and agreed with the final plan.    CRISTEL HAMILTON      "

## 2021-12-13 NOTE — PATIENT INSTRUCTIONS
PERSONAL PREVENTIVE SERVICES PLAN - SERVICES     Review these tests with your medical staff then decide which ones you want and take this page home for your reference      SCREENING TESTS     Description   Year of Last Screening   Recommended Today?   Heart disease screening blood tests    Cholesterol level Reducing cholesterol can reduce your risk of heart attacks by 25%.  Screening is recommended yearly if you are at risk of heart disease otherwise every 4-5 years 10/18/21 No; is up to date.   Diabetes screening tests    Hemoglobin A1c blood test   Finding and treating diabetes early can reduce complications.  Screening recommended/covered yearly if you have high blood pressure, high cholesterol, obesity (BMI >30), or a history of high blood glucose tests; or 2 of the following: family history of diabetes, overweight (BMI >25 but <30), age 65 years or older, and a history of diabetes of pregnancy or gave birth to baby weighing more than 9 lbs. 6/15/2017  hgbA1c  5.9 No: is not indicated today.   Hepatitis B screening Finding hepatitis B early can reduce complications.  Screening is recommended for persons with selected risk factors.  No: is not indicated today.   Hepatitis C screening Finding hepatitis C early can reduce complications.  Screening is recommended for all persons born from 1945 through 1965 and for those with selected other risk factors.  10/18/21  positive 11/1/2021  RNA,undetected   HIV screening Finding HIV early can reduce complications.  Screening is recommended for persons with risk factors for HIV infection.  No: is not indicated today.   Glaucoma screening Early detection of glaucoma can prevent blindness.   Please talk to your eye doctor about this.       SCREENING TESTS     Description   Year of Last Screening   Recommended Today?   Colorectal cancer screening    Fecal occult blood test     Screening colonoscopy Screening for colon cancer has been shown to reduce death from colon cancer by  25-30%. Screening recommended to start at 50 years and continuing until age 75 years.    No: is not indicated today.   Breast Cancer Screening (women)    Mammogram Mammogram screening for breast cancer has been shown to reduce the risk of dying from breast cancer and prolong life. Screening is recommended every 1-2 years for women aged 50 to 74 years.  12/7/12  negative No: is not indicated today.   Cervical Cancer screening (women)    Pap Cervical pap smears can reduce cervical cancer. Screening is recommended annually if high risk (history of abnormal pap smears) otherwise every 2-3 years, stop screening at 65 years of age if history of normal paps.  No: is not indicated today.   Screening for Osteoporosis:  Bone mass measurements (women)    Dexa Scan Screening and treating Osteoporosis can reduce the risk of hip and spine fractures. Screening is recommended in women 65 years or older and in women and men at risk of osteoporosis.  Yes; Recommended.   Screening for Lung Cancer     Low-dose CT scanning Screening can reduce mortality in persons aged 55-80 who have smoked at least 30 pack-years and who are either still smoking or have quit in the past 15 years.  No: is not indicated today.   Abdominal Aortic Aneurysm (AAA) screening    Ultrasound (US)   An aneurysm treated before rupture is very safe -a ruptured aneurysm can be fatal.  Screening  by US for AAA is limited to patients who meet one of the following criteria:    Men who are 65-75 years old and have smoked more than 100 cigarettes in their lifetime    Anyone with a family history of abdominal aortic aneurysm  No: is not indicated today.     Here are your recommended immunizations.  Take this home for your reference.                                                    IMMUNIZATIONS Description Recommend today?     Influenza (Flu shot) Prevents flu; should get every year No; is up to date.   PCV 13 Pneumonia vaccination; you get it once Yes; Recommended     PPSV 23 Second pneumonia vaccination; usually get it 1 year after PCV 13 No: is not indicated today.   Zoster (Shingles) Prevents shingles; you get it once  (Check with Part D insurance for coverage, must receive at a pharmacy, not clinic) Yes; Recommended   Tetanus Prevents tetanus; once every 10 years Yes; Recommended      Hepatitis B  If you have any of the following risk factors you should be immunized for hepatitis B: severe kidney disease, people who live in the same house as a carrier of Hepatitis B virus, people who live in  institutions (e.g. nursing homes or group homes), homosexual men, patients with hemophilia who received Factor VIII or IX concentrates, abusers of illicit injectable drugs No: is not indicated today.      PATIENT INSTRUCTIONS    Yearly exam:     See your health care provider every year in order to review changes in your health, review medicines that you take, and discuss preventive care needs such as immunizations and cancer screening.    Get a flu shot each year.     Advance Directives:    If you have not done so, you are encouraged to complete advance directives and/or a living will.   More information about advance directives can be found at: http://www.[place]med.org/advocacy/Key-Issues/Advance-Directives    Nutrition:     Eat at least 5 servings of fruits and vegetables each day.     Eat whole-grain bread, whole-wheat pasta and brown rice instead of white grains and rice.     Talk to your doctor about Calcium and Vitamin D.     Lifestyle:    Exercise for at least 150 minutes a week (30 minutes a day, 5 days a week). This will help you control your weight and prevent disease.     Limit alcohol to one drink per day.     If you smoke, try to quit - your doctor will be happy to help.     Wear sunscreen to prevent skin cancer.     See your dentist every six months for an exam and cleaning.     See your eye doctor every 1 to 2 years to screen for conditions such as glaucoma, macular  degeneration and cataracts.                              Personalized Prevention Plan  You are due for the preventive services outlined below.  Your care team is available to assist you in scheduling these services.  If you have already completed any of these items, please share that information with your care team to update in your medical record.  Health Maintenance Due   Topic Date Due     Osteoporosis Screening  Never done     Discuss Advance Care Planning  Never done     Zoster (Shingles) Vaccine (1 of 2) Never done     Diptheria Tetanus Pertussis (DTAP/TDAP/TD) Vaccine (1 - Tdap) 06/05/2009     FALL RISK ASSESSMENT  07/02/2020

## 2021-12-27 ENCOUNTER — APPOINTMENT (OUTPATIENT)
Dept: INTERPRETER SERVICES | Facility: CLINIC | Age: 76
End: 2021-12-27
Payer: COMMERCIAL

## 2022-01-18 ENCOUNTER — OFFICE VISIT (OUTPATIENT)
Dept: FAMILY MEDICINE | Facility: CLINIC | Age: 77
End: 2022-01-18
Payer: COMMERCIAL

## 2022-01-18 VITALS
TEMPERATURE: 97.9 F | WEIGHT: 115 LBS | HEIGHT: 56 IN | DIASTOLIC BLOOD PRESSURE: 71 MMHG | SYSTOLIC BLOOD PRESSURE: 139 MMHG | HEART RATE: 79 BPM | BODY MASS INDEX: 25.87 KG/M2 | OXYGEN SATURATION: 95 % | RESPIRATION RATE: 22 BRPM
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
## 2022-01-18 DIAGNOSIS — S46.812A STRAIN OF LEFT TRAPEZIUS MUSCLE, INITIAL ENCOUNTER: Primary | ICD-10-CM

## 2022-01-18 PROCEDURE — 99214 OFFICE O/P EST MOD 30 MIN: CPT | Performed by: STUDENT IN AN ORGANIZED HEALTH CARE EDUCATION/TRAINING PROGRAM

## 2022-01-18 RX ORDER — IBUPROFEN 200 MG
200 TABLET ORAL 2 TIMES DAILY PRN
Qty: 30 TABLET | Refills: 0 | Status: SHIPPED | OUTPATIENT
Start: 2022-01-18 | End: 2024-03-14

## 2022-01-18 RX ORDER — MENTHOL TOPICAL ANALGESIC 210 MG/1
1 PATCH TOPICAL DAILY PRN
Qty: 15 PATCH | Refills: 0 | Status: SHIPPED | OUTPATIENT
Start: 2022-01-18 | End: 2022-07-18

## 2022-01-18 ASSESSMENT — MIFFLIN-ST. JEOR: SCORE: 859.64
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
## 2022-01-18 NOTE — NURSING NOTE
Due to patient being non-English speaking/uses sign language, an  was used for this visit. Only for face-to-face interpretation by an external agency, date and length of interpretation can be found on the scanned worksheet.     name: Tesfaye Miles  Agency: [employer]  Language: Genia   Telephone number: [phone]  Type of interpretation: Face-to-face, spoken

## 2022-01-18 NOTE — PATIENT INSTRUCTIONS
You have a muscular strain of your neck:    I recommend continuing with tylenol for pain.  You can also take ibuprofen 400mg twice daily for pain (I sent this to pharmacy for you).  I also sent a muscle rub for you to help with pain.     You can continue to stay active.  Gentle movement is good for healing, but no heavy lifting or pushing.      If you continue to have pain in a week you can get an appointment with Dr Lew or Dr Shane who can do manual therapy in clinic for you

## 2022-02-15 ENCOUNTER — OFFICE VISIT (OUTPATIENT)
Dept: FAMILY MEDICINE | Facility: CLINIC | Age: 77
End: 2022-02-15
Payer: COMMERCIAL

## 2022-02-15 ENCOUNTER — ANCILLARY PROCEDURE (OUTPATIENT)
Dept: GENERAL RADIOLOGY | Facility: CLINIC | Age: 77
End: 2022-02-15
Attending: STUDENT IN AN ORGANIZED HEALTH CARE EDUCATION/TRAINING PROGRAM
Payer: COMMERCIAL

## 2022-02-15 VITALS
SYSTOLIC BLOOD PRESSURE: 127 MMHG | HEART RATE: 72 BPM | DIASTOLIC BLOOD PRESSURE: 62 MMHG | BODY MASS INDEX: 26.78 KG/M2 | WEIGHT: 119.04 LBS | TEMPERATURE: 97.5 F | HEIGHT: 56 IN | RESPIRATION RATE: 16 BRPM | OXYGEN SATURATION: 97 %
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
## 2022-02-15 PROCEDURE — 72040 X-RAY EXAM NECK SPINE 2-3 VW: CPT | Mod: FY | Performed by: RADIOLOGY

## 2022-02-15 PROCEDURE — 99213 OFFICE O/P EST LOW 20 MIN: CPT | Performed by: STUDENT IN AN ORGANIZED HEALTH CARE EDUCATION/TRAINING PROGRAM

## 2022-02-15 ASSESSMENT — MIFFLIN-ST. JEOR: SCORE: 882.71

## 2022-02-15 NOTE — NURSING NOTE
Due to patient being non-English speaking/uses sign language, an  was used for this visit. Only for face-to-face interpretation by an external agency, date and length of interpretation can be found on the scanned worksheet.     name: Stanislaw layton  Agency: [employer]  Language: Genia   Telephone number: [phone]  Type of interpretation: Face-to-face, spoken
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
## 2022-03-11 ENCOUNTER — DOCUMENTATION ONLY (OUTPATIENT)
Dept: FAMILY MEDICINE | Facility: CLINIC | Age: 77
End: 2022-03-11
Payer: COMMERCIAL
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
## 2022-04-08 ENCOUNTER — IMMUNIZATION (OUTPATIENT)
Dept: FAMILY MEDICINE | Facility: CLINIC | Age: 77
End: 2022-04-08
Payer: COMMERCIAL

## 2022-04-08 PROCEDURE — 91306 COVID-19,PF,MODERNA (18+ YRS BOOSTER .25ML): CPT

## 2022-04-08 PROCEDURE — 99207 PR NO CHARGE LOS: CPT

## 2022-04-08 PROCEDURE — 0064A COVID-19,PF,MODERNA (18+ YRS BOOSTER .25ML): CPT

## 2022-07-18 ENCOUNTER — OFFICE VISIT (OUTPATIENT)
Dept: FAMILY MEDICINE | Facility: CLINIC | Age: 77
End: 2022-07-18
Payer: COMMERCIAL

## 2022-07-18 VITALS
WEIGHT: 112 LBS | RESPIRATION RATE: 22 BRPM | HEART RATE: 82 BPM | TEMPERATURE: 98 F | HEIGHT: 56 IN | SYSTOLIC BLOOD PRESSURE: 130 MMHG | DIASTOLIC BLOOD PRESSURE: 78 MMHG | BODY MASS INDEX: 25.19 KG/M2 | OXYGEN SATURATION: 96 %

## 2022-07-18 DIAGNOSIS — R10.13 ABDOMINAL PAIN, EPIGASTRIC: Primary | ICD-10-CM

## 2022-07-18 LAB
ALBUMIN SERPL BCG-MCNC: 4.7 G/DL (ref 3.5–5.2)
ALP SERPL-CCNC: 75 U/L (ref 35–104)
ALT SERPL W P-5'-P-CCNC: 13 U/L (ref 10–35)
ANION GAP SERPL CALCULATED.3IONS-SCNC: 14 MMOL/L (ref 3–14)
AST SERPL W P-5'-P-CCNC: 22 U/L (ref 10–35)
BILIRUB DIRECT SERPL-MCNC: <0.2 MG/DL (ref 0–0.3)
BILIRUB SERPL-MCNC: 0.7 MG/DL
BUN SERPL-MCNC: 11 MG/DL (ref 7–30)
CALCIUM SERPL-MCNC: 10.1 MG/DL (ref 8.5–10.1)
CHLORIDE BLD-SCNC: 99 MMOL/L (ref 94–109)
CO2 SERPL-SCNC: 30 MMOL/L (ref 20–32)
CREAT SERPL-MCNC: 0.5 MG/DL (ref 0.52–1.04)
ERYTHROCYTE [DISTWIDTH] IN BLOOD BY AUTOMATED COUNT: 12.7 % (ref 10–15)
GFR SERPL CREATININE-BSD FRML MDRD: >90 ML/MIN/1.73M2
GLUCOSE BLD-MCNC: 141 MG/DL (ref 70–99)
HCT VFR BLD AUTO: 44.9 % (ref 35–47)
HGB BLD-MCNC: 15.4 G/DL (ref 11.7–15.7)
LIPASE SERPL-CCNC: 30 U/L (ref 13–60)
MCH RBC QN AUTO: 30.6 PG (ref 26.5–33)
MCHC RBC AUTO-ENTMCNC: 34.3 G/DL (ref 31.5–36.5)
MCV RBC AUTO: 89 FL (ref 78–100)
PLATELET # BLD AUTO: 222 10E3/UL (ref 150–450)
POTASSIUM BLD-SCNC: 4.2 MMOL/L (ref 3.4–5.3)
PROT SERPL-MCNC: 8.1 G/DL (ref 6.4–8.3)
RBC # BLD AUTO: 5.04 10E6/UL (ref 3.8–5.2)
SODIUM SERPL-SCNC: 143 MMOL/L (ref 133–144)
WBC # BLD AUTO: 11.2 10E3/UL (ref 4–11)

## 2022-07-18 PROCEDURE — 83690 ASSAY OF LIPASE: CPT | Performed by: FAMILY MEDICINE

## 2022-07-18 PROCEDURE — 99214 OFFICE O/P EST MOD 30 MIN: CPT | Performed by: FAMILY MEDICINE

## 2022-07-18 PROCEDURE — 80053 COMPREHEN METABOLIC PANEL: CPT | Performed by: FAMILY MEDICINE

## 2022-07-18 PROCEDURE — 36415 COLL VENOUS BLD VENIPUNCTURE: CPT | Performed by: FAMILY MEDICINE

## 2022-07-18 PROCEDURE — 82248 BILIRUBIN DIRECT: CPT | Performed by: FAMILY MEDICINE

## 2022-07-18 PROCEDURE — 85027 COMPLETE CBC AUTOMATED: CPT | Performed by: FAMILY MEDICINE

## 2022-07-18 RX ORDER — PANTOPRAZOLE SODIUM 40 MG/1
40 TABLET, DELAYED RELEASE ORAL DAILY
Qty: 30 TABLET | Refills: 0 | Status: SHIPPED | OUTPATIENT
Start: 2022-07-18 | End: 2024-08-01

## 2022-07-18 NOTE — PROGRESS NOTES
"  Assessment & Plan     (R10.13) Abdominal pain, epigastric  (primary encounter diagnosis)  Comment: Check labs in clinic, clinically is improving, stay hydrated  Plan: CBC with platelets, Basic metabolic panel,         Lipase, Hepatic function panel,   Start pantoprazole (PROTONIX) 40 MG EC tablet                       BMI:   Estimated body mass index is 25.11 kg/m  as calculated from the following:    Height as of this encounter: 1.422 m (4' 8\").    Weight as of this encounter: 50.8 kg (112 lb).           No follow-ups on file.    [facility] BIRG[facility] Wiggins is a 77 year old accompanied by her daughter, presenting for the following health issues:  Abdominal Pain (Possible acid reflex) and Medication Reconciliation (Completed)      HPI     1. Abdominal pain: Mid section, pain just started yesterday, has had vomiting with nausea. Has had loose stools with no blood. Stools have black and has been taking tylenol. No sick contacts. No fever or chills. Has a burning sensation. Some light- headedness. Was able to eat rice with luke-warm water. She did eat out at a restaurant with her family. She did not share meals. The nausea and pain started after visiting the restaurant.    Review of Systems         Objective    /78   Pulse 82   Temp 98  F (36.7  C)   Resp 22   Ht 1.422 m (4' 8\")   Wt 50.8 kg (112 lb)   SpO2 96%   BMI 25.11 kg/m    Body mass index is 25.11 kg/m .  Physical Exam   GENERAL: healthy, alert and no distress  RESP: lungs clear to auscultation - no rales, rhonchi or wheezes  CV: regular rate and rhythm, normal S1 S2, no S3 or S4, soft murmur, no click or rub, no peripheral edema and peripheral pulses strong  ABDOMEN: soft, nontender, no hepatosplenomegaly, no masses and bowel sounds normal  MS: no gross musculoskeletal defects noted, no edema    Results for orders placed or performed in visit on 07/18/22 (from the past 24 hour(s))   CBC with " platelets   Result Value Ref Range    WBC Count 11.2 (H) 4.0 - 11.0 10e3/uL    RBC Count 5.04 3.80 - 5.20 10e6/uL    Hemoglobin 15.4 11.7 - 15.7 g/dL    Hematocrit 44.9 35.0 - 47.0 %    MCV 89 78 - 100 fL    MCH 30.6 26.5 - 33.0 pg    MCHC 34.3 31.5 - 36.5 g/dL    RDW 12.7 10.0 - 15.0 %    Platelet Count 222 150 - 450 10e3/uL   Basic metabolic panel   Result Value Ref Range    Sodium 143 133 - 144 mmol/L    Potassium 4.2 3.4 - 5.3 mmol/L    Chloride 99 94 - 109 mmol/L    Carbon Dioxide (CO2) 30 20 - 32 mmol/L    Anion Gap 14 3 - 14 mmol/L    Urea Nitrogen 11 7 - 30 mg/dL    Creatinine 0.50 (L) 0.52 - 1.04 mg/dL    Calcium 10.1 8.5 - 10.1 mg/dL    Glucose 141 (H) 70 - 99 mg/dL    GFR Estimate >90 >60 mL/min/1.73m2   Lipase   Result Value Ref Range    Lipase 30 13 - 60 U/L   Hepatic function panel   Result Value Ref Range    Protein Total 8.1 6.4 - 8.3 g/dL    Albumin 4.7 3.5 - 5.2 g/dL    Bilirubin Total 0.7 <=1.2 mg/dL    Alkaline Phosphatase 75 35 - 104 U/L    AST 22 10 - 35 U/L    ALT 13 10 - 35 U/L    Bilirubin Direct <0.20 0.00 - 0.30 mg/dL

## 2022-07-18 NOTE — NURSING NOTE
Due to patient being non-English speaking/uses sign language, an  was used for this visit. Only for face-to-face interpretation by an external agency, date and length of interpretation can be found on the scanned worksheet.     name: Unknown  Agency: [employer] language [employer]  Language: Genia   Telephone number: [phone]  Type of interpretation: Telephone, spoken

## 2022-07-25 ENCOUNTER — TELEPHONE (OUTPATIENT)
Dept: FAMILY MEDICINE | Facility: CLINIC | Age: 77
End: 2022-07-25

## 2022-09-17 ENCOUNTER — HEALTH MAINTENANCE LETTER (OUTPATIENT)
Age: 77
End: 2022-09-17

## 2023-01-23 ENCOUNTER — HEALTH MAINTENANCE LETTER (OUTPATIENT)
Age: 78
End: 2023-01-23

## 2023-03-15 ENCOUNTER — DOCUMENTATION ONLY (OUTPATIENT)
Dept: FAMILY MEDICINE | Facility: CLINIC | Age: 78
End: 2023-03-15
Payer: COMMERCIAL
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
## 2023-03-29 ENCOUNTER — TRANSFERRED RECORDS (OUTPATIENT)
Dept: HEALTH INFORMATION MANAGEMENT | Facility: CLINIC | Age: 78
End: 2023-03-29
Payer: COMMERCIAL

## 2023-06-15 ENCOUNTER — OFFICE VISIT (OUTPATIENT)
Dept: FAMILY MEDICINE | Facility: CLINIC | Age: 78
End: 2023-06-15
Payer: COMMERCIAL

## 2023-06-15 VITALS
TEMPERATURE: 97.7 F | OXYGEN SATURATION: 97 % | RESPIRATION RATE: 22 BRPM | DIASTOLIC BLOOD PRESSURE: 66 MMHG | WEIGHT: 113 LBS | SYSTOLIC BLOOD PRESSURE: 131 MMHG | HEART RATE: 58 BPM | BODY MASS INDEX: 25.42 KG/M2 | HEIGHT: 56 IN

## 2023-06-15 VITALS
WEIGHT: 113 LBS | OXYGEN SATURATION: 97 % | DIASTOLIC BLOOD PRESSURE: 66 MMHG | HEART RATE: 58 BPM | HEIGHT: 56 IN | BODY MASS INDEX: 25.42 KG/M2 | SYSTOLIC BLOOD PRESSURE: 131 MMHG | RESPIRATION RATE: 22 BRPM | TEMPERATURE: 97.7 F

## 2023-06-15 DIAGNOSIS — Z13.820 SCREENING FOR OSTEOPOROSIS: ICD-10-CM

## 2023-06-15 DIAGNOSIS — Z00.00 ENCOUNTER FOR MEDICARE ANNUAL WELLNESS EXAM: Primary | ICD-10-CM

## 2023-06-15 DIAGNOSIS — Z78.0 POST-MENOPAUSAL: ICD-10-CM

## 2023-06-15 DIAGNOSIS — M25.512 CHRONIC PAIN OF BOTH SHOULDERS: Primary | ICD-10-CM
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
## 2023-06-15 DIAGNOSIS — G89.29 CHRONIC PAIN OF BOTH SHOULDERS: Primary | ICD-10-CM

## 2023-06-15 DIAGNOSIS — M25.511 CHRONIC PAIN OF BOTH SHOULDERS: Primary | ICD-10-CM

## 2023-06-15 DIAGNOSIS — Z74.09 DECREASED MOBILITY AND ENDURANCE: ICD-10-CM

## 2023-06-15 DIAGNOSIS — Z00.00 WELLNESS EXAMINATION: Primary | ICD-10-CM

## 2023-06-15 PROCEDURE — 99213 OFFICE O/P EST LOW 20 MIN: CPT | Mod: GC

## 2023-06-15 PROCEDURE — 99207 PR NO BILLABLE SERVICE THIS VISIT: CPT

## 2023-06-15 PROCEDURE — 99213 OFFICE O/P EST LOW 20 MIN: CPT | Mod: 25

## 2023-06-15 PROCEDURE — G0439 PPPS, SUBSEQ VISIT: HCPCS | Mod: GC

## 2023-06-15 NOTE — PATIENT INSTRUCTIONS
PERSONAL PREVENTIVE SERVICES PLAN - SERVICES     Review these tests with your medical staff then decide which ones you want and take this page home for your reference      SCREENING TESTS     Description   Year of Last Screening   Recommended Today?   Heart disease screening blood tests    Cholesterol level Reducing cholesterol can reduce your risk of heart attacks by 25%.  Screening is recommended yearly if you are at risk of heart disease otherwise every 4-5 years 10/18/21 Yes; recommended   Diabetes screening tests    Hemoglobin A1c blood test   Finding and treating diabetes early can reduce complications.  Screening recommended/covered yearly if you have high blood pressure, high cholesterol, obesity (BMI >30), or a history of high blood glucose tests; or 2 of the following: family history of diabetes, overweight (BMI >25 but <30), age 65 years or older, and a history of diabetes of pregnancy or gave birth to baby weighing more than 9 lbs. 6/5/17  hgbA1c  5.9 No: is not indicated today.   Hepatitis B screening Finding hepatitis B early can reduce complications.  Screening is recommended for persons with selected risk factors.  No: is not indicated today.   Hepatitis C screening Finding hepatitis C early can reduce complications.  Screening is recommended for all persons born from 1945 through 1965 and for those with selected other risk factors.  11/1/21  neg No: is not indicated today.   HIV screening Finding HIV early can reduce complications.  Screening is recommended for persons with risk factors for HIV infection.  No: is not indicated today.   Glaucoma screening Early detection of glaucoma can prevent blindness.   Please talk to your eye doctor about this.       SCREENING TESTS     Description   Year of Last Screening   Recommended Today?   Colorectal cancer screening    Fecal occult blood test     Screening colonoscopy Screening for colon cancer has been shown to reduce death from colon cancer by 25-30%.  Screening recommended to start at 50 years and continuing until age 75 years.    No: is not indicated today.   Breast Cancer Screening (women)    Mammogram Mammogram screening for breast cancer has been shown to reduce the risk of dying from breast cancer and prolong life. Screening is recommended every 1-2 years for women aged 50 to 74 years.   No: is not indicated today.   Cervical Cancer screening (women)    Pap Cervical pap smears can reduce cervical cancer. Screening is recommended annually if high risk (history of abnormal pap smears) otherwise every 2-3 years, stop screening at 65 years of age if history of normal paps.  No: is not indicated today.   Screening for Osteoporosis:  Bone mass measurements (women)    Dexa Scan Screening and treating Osteoporosis can reduce the risk of hip and spine fractures. Screening is recommended in women 65 years or older and in women and men at risk of osteoporosis.  Yes; Recommended    Screening for Lung Cancer     Low-dose CT scanning Screening can reduce mortality in persons aged 55-80 who have smoked at least 30 pack-years and who are either still smoking or have quit in the past 15 years.  No: is not indicated today.   Abdominal Aortic Aneurysm (AAA) screening    Ultrasound (US)   An aneurysm treated before rupture is very safe -a ruptured aneurysm can be fatal.  Screening  by US for AAA is limited to patients who meet one of the following criteria:    Men who are 65-75 years old and have smoked more than 100 cigarettes in their lifetime    Anyone with a family history of abdominal aortic aneurysm  No: is not indicated today.     Here are your recommended immunizations.  Take this home for your reference.                                                    IMMUNIZATIONS Description Recommend today?     Influenza (Flu shot) Prevents flu; should get every year No: is not indicated today.   PCV 13 Pneumonia vaccination; you get it once No; is up to date.   PPSV 23 Second  pneumonia vaccination; usually get it 1 year after PCV 13 No; is up to date.   Zoster (Shingles) Prevents shingles; you get it once  (Check with Part D insurance for coverage, must receive at a pharmacy, not clinic) DUE #2 Shingrix   Tetanus Prevents tetanus; once every 10 years No; is up to date.     Hepatitis B  If you have any of the following risk factors you should be immunized for hepatitis B: severe kidney disease, people who live in the same house as a carrier of Hepatitis B virus, people who live in  institutions (e.g. nursing homes or group homes), homosexual men, patients with hemophilia who received Factor VIII or IX concentrates, abusers of illicit injectable drugs No: is not indicated today.      PATIENT INSTRUCTIONS    Yearly exam:     See your health care provider every year in order to review changes in your health, review medicines that you take, and discuss preventive care needs such as immunizations and cancer screening.    Get a flu shot each year.     Advance Directives:    If you have not done so, you are encouraged to complete advance directives and/or a living will.   More information about advance directives can be found at: http://www.mnmed.org/advocacy/Key-Issues/Advance-Directives    Nutrition:     Eat at least 5 servings of fruits and vegetables each day.     Eat whole-grain bread, whole-wheat pasta and brown rice instead of white grains and rice.     Talk to your doctor about Calcium and Vitamin D.     Lifestyle:    Exercise for at least 150 minutes a week (30 minutes a day, 5 days a week). This will help you control your weight and prevent disease.     Limit alcohol to one drink per day.     If you smoke, try to quit - your doctor will be happy to help.     Wear sunscreen to prevent skin cancer.     See your dentist every six months for an exam and cleaning.     See your eye doctor every 1 to 2 years to screen for conditions such as glaucoma, macular degeneration and  cataracts.                            Patient Education   Personalized Prevention Plan  You are due for the preventive services outlined below.  Your care team is available to assist you in scheduling these services.  If you have already completed any of these items, please share that information with your care team to update in your medical record.  Health Maintenance Due   Topic Date Due     Osteoporosis Screening  Never done     COVID-19 Vaccine (5 - Moderna series) 06/03/2022     Zoster (Shingles) Vaccine (2 of 2) 09/13/2022       Preventing Falls at Home  A person can fall for many reasons. Older adults may fall because reaction time slows down as we age. Your muscles and joints may get stiff, weak, or less flexible because of illness, medicines, or a physical condition.   Other health problems that make falls more likely include:     Arthritis    Dizziness or lightheadedness when you stand up (orthostatic hypotension)    History of a stroke    Dizziness    Anemia    Certain medicines taken for mental illness or to control blood pressure.    Problems with balance or gait    Bladder or urinary problems    History of falling    Changes in vision (vision impairment)    Changes in thinking skills and memory (cognitive impairment)  Injuries from a fall can include serious injuries such as broken bones, dislocated joints, internal bleeding and cuts. Injuries like these can limit your independence.   Prevention tips  To help prevent falls and fall-related injuries, follow the tips below.    Floors  To make floors safer:     Put nonskid pads under area rugs.    Remove small rugs.    Replace worn floor coverings.    Tack carpets firmly to each step on carpeted stairs. Put nonskid strips on the edges of uncarpeted stairs.    Keep floors and stairs free of clutter and cords.    Arrange furniture so there are clear pathways.    Clean up any spills right away.  Bathrooms    To make bathrooms safer:     Install grab bars in  the tub or shower.    Apply nonskid strips or put a nonskid rubber mat in the tub or shower.    Sit on a bath chair to bathe.    Use bathmats with nonskid backing.  Lighting  To improve visibility in your home:      Keep a flashlight in each room. Or put a lamp next to the bed within easy reach.    Put nightlights in the bedrooms, hallways, kitchen, and bathrooms.    Make sure all stairways have good lighting.    Take your time when going up and down stairs.    Put handrails on both sides of stairs and in walkways for more support. To prevent injury to your wrist or arm, don t use handrails to pull yourself up.    Install grab bars to pull yourself up.    Move or rearrange items that you use often. This will make them easier to find or reach.    Look at your home to find any safety hazards. Especially look at doorways, walkways, and the driveway. Remove or repair any safety problems that you find.  Other changes to make    Look around to find any safety hazards. Look closely at doorways, walkways, and the driveway. Remove or repair any safety problems that you find.    Wear shoes that fit well.    Take your time when going up and down stairs.    Put handrails on both sides of stairs and in walkways for more support. To prevent injury to your wrist or arm, don t use handrails to pull yourself up.    Install grab bars wherever needed to pull yourself up.    Arrange items that you use often. This will make them easier to find or reach.    [employer] last reviewed this educational content on 3/1/2020    7816-1519 [employer]. All rights reserved. This information is not intended as a substitute for professional medical care. Always follow your healthcare professional's instructions.

## 2023-06-15 NOTE — PROGRESS NOTES
Preceptor Attestation:   Patient seen, evaluated and discussed with the resident. I have verified the content of the note, which accurately reflects my assessment of the patient and the plan of care.    Supervising Physician:Brigitte Buckley MD    [facility]
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
## 2023-06-15 NOTE — PROGRESS NOTES
Medicare Wellness Visit  Health Risk Assessment           Health Risk Assessment / Review of Systems     Constitutional: Any fevers or night sweats?  YES -experiencing a headache, would like to request medication.    Eyes:  Vision problems    YES -reports vision has changed since last year, vision more blurred. Has never had eye examination done.    Hearing Do you feel you have hearing loss?   YES     Cardiovascular: Any chest pain, fast or irregular heart beat, calf pain with walking?     No           Respiratory:   Any breathing problems or cough?    YES - reports having dryness in mouth, intermittent, dry cough x 1 month.    Gastrointestinal: Any stomach or stool problems?   No      Genitourinary: Do you have difficulty controlling urination?    YES - reports increase in urinary frequency and urge, small amount of urine return. No dysuria, hematuria. Symptoms have varied and been experiencing x 2-3 months.    Muscles and Joints: Any joint stiffness or soreness?    YES - generalized body aches.    Skin: Any concerning lesions or moles?    YES - check lump in right, lower leg, some discomfort/ tenderness when palpate.    Nervous System: Any loss of strength or feeling, numbness or tingling, shaking, dizziness, or headache?   YES -dizziness with minimal physical activity, bilateral hands tingling sensation and decrease in strength.    Mental Health: Any depression, anxiety or problems sleeping?     YES - unable to sleep well at night time due to generalized body aches.    Cognition: Do you have any problems with your memory?   YES - reports increase in forgetfulness.           Medical Care     What other specialists or organizations are involved in your medical care?  none  Patient Care Team       Relationship Specialty Notifications Start End    Prince, April MD Keysha PCP - General Family Medicine  10/12/21     Merged    Phone: [phone] Fax: [phone]         [facility] [address] [place] [address]     Meredith Morrison MD Assigned PCP   11/14/21     Phone: [phone] Fax: [phone]         [facility] [place] [address]          Have you been to the ER or overnight in the hospital in the last year?  No          Social History / Home Safety       Marital Status:  Who lives in your household? self    Do you feel threatened or controlled by a partner, ex-partner or anyone in your life? No     Has anyone hurt you physically, for example by pushing, hitting, slapping or kicking you   or forcing you to have sex? No          Does your home have any of the following safety concerns; loose rugs in the hallway,  bathrooms with no grab bars by the tub or toilet, stairs with no handrails or poorly lit areas?  No     Do you need help with dressing yourself, bathing, eating or getting around your home?  No     Do you need help with the phone, transportation, shopping, preparing meals, housework, laundry, medications or managing money?No       Risk Behaviors and Healthy Habits     History   Smoking Status     Never   Smokeless Tobacco     Never     How many servings of fruits and vegetables do you eat a day? 3-4 servings a day    Exercise: 6-7 days/week for an average of 15-30 minutes walking      Do you frequently drive without a seatbelt? No     Do you use tobacco?  No     Do you use any other drugs? No         Do you use alcohol?No      Frailty Assessment            Have you lost 10 or more pounds unintentionally in the previous year? No     How difficult is walking from one room to the other on the same level?not       Is it difficult to lift or carry something as heavy as 10 pounds?severely      Compared with most (men/women) your age, would you say  that you are more active, less active, or about the same?  Unable to receive an answer from patient.          6/15/2023    10:57 AM 12/13/2021     9:20 AM 6/5/2017     1:51 PM   FALL RISK ASSESSMENT   Fallen 2 or more times in the past year? No No  No   Any fall with injury in the past year? No No No   Timed Up and Go Test/Seconds (13.5 is a fall risk; contact physician) 19 12          Advance Directives:   Discussed with patient and family as appropriate. Has patient  completed advance directives and/or a living will? No      Digna Cardenas RN

## 2023-08-02 ENCOUNTER — MEDICAL CORRESPONDENCE (OUTPATIENT)
Dept: HEALTH INFORMATION MANAGEMENT | Facility: CLINIC | Age: 78
End: 2023-08-02

## 2023-08-09 ENCOUNTER — TELEPHONE (OUTPATIENT)
Dept: FAMILY MEDICINE | Facility: CLINIC | Age: 78
End: 2023-08-09
Payer: COMMERCIAL

## 2023-08-09 NOTE — TELEPHONE ENCOUNTER
Forms Request  Name of form/paperwork: [facility] paperwork   Do we have the form: no    When is form needed by: not specified     How would you like the form returned: call daughter Evan Fritz for  [phone]  Fax: N/A  Patient Notified form requests are processed in 10 business days: yes   Okay to leave a detailed message? yes

## 2023-12-12 ENCOUNTER — DOCUMENTATION ONLY (OUTPATIENT)
Dept: FAMILY MEDICINE | Facility: CLINIC | Age: 78
End: 2023-12-12
Payer: COMMERCIAL

## 2023-12-21 NOTE — PROGRESS NOTES
Annual home visit completed a little earlier d/t clt request.   Completed HRA/LTCC, OBRA, POC and PCA assessment.  Clt lives in single family home with her dgtr. Per clt and her dgtr she is now needing more assistance with her cares d/t body aches and pains.  Especially has difficulty raising her arms d/t shoulder pains.  She is forgetful and is not usually left alone at home.  Some days she accompanies her dgtr to her business as she cannot be left alone. Safe disposal of meds discussed and copy of sites given to client.  CC reviewed and received expressed/verbal approval by member of the care plan, including choice of services and providers, choices related to sharing the plan or portions of the plan with others, appeals rights, and data privacy information.  Currently has PCA services and plan to start homemaking services once open to EW.          POC completed and copies to be sent to client and PCP.  Revisit in six months

## 2024-03-14 ENCOUNTER — OFFICE VISIT (OUTPATIENT)
Dept: FAMILY MEDICINE | Facility: CLINIC | Age: 79
End: 2024-03-14
Payer: COMMERCIAL

## 2024-03-14 VITALS
WEIGHT: 114 LBS | RESPIRATION RATE: 16 BRPM | OXYGEN SATURATION: 99 % | BODY MASS INDEX: 25.56 KG/M2 | HEART RATE: 68 BPM | DIASTOLIC BLOOD PRESSURE: 70 MMHG | SYSTOLIC BLOOD PRESSURE: 119 MMHG | TEMPERATURE: 98 F

## 2024-03-14 DIAGNOSIS — M75.40 ROTATOR CUFF IMPINGEMENT SYNDROME, UNSPECIFIED LATERALITY: ICD-10-CM

## 2024-03-14 DIAGNOSIS — G89.29 CHRONIC PAIN OF BOTH SHOULDERS: ICD-10-CM

## 2024-03-14 DIAGNOSIS — M75.02 ADHESIVE CAPSULITIS OF BOTH SHOULDERS: ICD-10-CM

## 2024-03-14 DIAGNOSIS — M25.511 CHRONIC PAIN OF BOTH SHOULDERS: ICD-10-CM
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
## 2024-03-14 DIAGNOSIS — M75.01 ADHESIVE CAPSULITIS OF BOTH SHOULDERS: ICD-10-CM

## 2024-03-14 DIAGNOSIS — M25.512 CHRONIC PAIN OF BOTH SHOULDERS: ICD-10-CM

## 2024-03-14 PROCEDURE — 99214 OFFICE O/P EST MOD 30 MIN: CPT | Mod: 25

## 2024-03-14 PROCEDURE — 20610 DRAIN/INJ JOINT/BURSA W/O US: CPT | Mod: 50

## 2024-03-14 RX ORDER — LIDOCAINE HYDROCHLORIDE 10 MG/ML
4 INJECTION, SOLUTION INFILTRATION; PERINEURAL ONCE
Status: COMPLETED | OUTPATIENT
Start: 2024-03-14 | End: 2024-03-14

## 2024-03-14 RX ORDER — TRIAMCINOLONE ACETONIDE 40 MG/ML
40 INJECTION, SUSPENSION INTRA-ARTICULAR; INTRAMUSCULAR ONCE
Status: COMPLETED | OUTPATIENT
Start: 2024-03-14 | End: 2024-03-14

## 2024-03-14 RX ORDER — RESPIRATORY SYNCYTIAL VIRUS VACCINE 120MCG/0.5
0.5 KIT INTRAMUSCULAR ONCE
Qty: 1 EACH | Refills: 0 | Status: CANCELLED | OUTPATIENT
Start: 2024-03-14 | End: 2024-03-14

## 2024-03-14 RX ADMIN — LIDOCAINE HYDROCHLORIDE 4 ML: 10 INJECTION, SOLUTION INFILTRATION; PERINEURAL at 13:00

## 2024-03-14 RX ADMIN — TRIAMCINOLONE ACETONIDE 40 MG: 40 INJECTION, SUSPENSION INTRA-ARTICULAR; INTRAMUSCULAR at 13:07

## 2024-03-14 RX ADMIN — LIDOCAINE HYDROCHLORIDE 4 ML: 10 INJECTION, SOLUTION INFILTRATION; PERINEURAL at 13:06

## 2024-03-14 RX ADMIN — TRIAMCINOLONE ACETONIDE 40 MG: 40 INJECTION, SUSPENSION INTRA-ARTICULAR; INTRAMUSCULAR at 13:04
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
## 2024-03-14 NOTE — PROGRESS NOTES
Preceptor Attestation:   Patient seen, evaluated and discussed with the resident. I was present for and supervised the entire procedure. I have verified the content of the note, which accurately reflects my assessment of the patient and the plan of care.  Supervising Physician:Lesvia Cooper MD  [facility]

## 2024-03-14 NOTE — PROGRESS NOTES
PROCEDURE: RIGHT SHOULDER JOINT INJECTION.         After a discussion of risks, benefits and side effects of procedure, informed patient consent was obtained.       The right shoulder (subacromial) was prepped and draped in the usual clean fashion (sterile not required for this procedure).  4 cc of 1% lidocaine was used for local analgesia.    ASPIRATION: Not performed.     INJECTION:  Using 4 cc of 1% lidocaine mixed                           with 40 mg of Kenalog, the right subacromial space was successfully injected                           without complication.  Patient did experience some pain                          relief following injection.      PROCEDURE: LEFT SHOULDER JOINT INJECTION.         After a discussion of risks, benefits and side effects of procedure, informed patient consent was obtained.       The left shoulder (subacromial) was prepped and draped in the usual clean fashion (sterile not required for this procedure).  4 cc of 1% lidocaine was used for local analgesia.    ASPIRATION: Not performed.     INJECTION:  Using 4 cc of 1% lidocaine mixed                           with 40 mg of Kenalog, the right subacromial space was successfully injected                           without complication.  Patient did experience some pain                          relief following injection.      Attending Dr Cooper was present for the entirety of the above procedures.     Sofia Espinosa MD, PGY-2  [facility]   Pager #: [phone]    Patient scheduled for screening colonoscopy for history of breast cancer on 9/3/21 at 1200 with MAC. Prescription for Nulytely sent to pharmacy. Covid test scheduled on 8/31/21 at 1410 at the [facility] lab.     Vaccination COVID-19 status:  This patient will need a pre-procedural test because they are immunocompromised or the surgeon wants the pre-test.

## 2024-04-18 ENCOUNTER — OFFICE VISIT (OUTPATIENT)
Dept: FAMILY MEDICINE | Facility: CLINIC | Age: 79
End: 2024-04-18
Payer: COMMERCIAL

## 2024-04-18 ENCOUNTER — ANCILLARY PROCEDURE (OUTPATIENT)
Dept: GENERAL RADIOLOGY | Facility: CLINIC | Age: 79
End: 2024-04-18
Attending: FAMILY MEDICINE
Payer: COMMERCIAL

## 2024-04-18 VITALS
DIASTOLIC BLOOD PRESSURE: 73 MMHG | HEART RATE: 68 BPM | TEMPERATURE: 98.2 F | BODY MASS INDEX: 25.11 KG/M2 | WEIGHT: 112 LBS | OXYGEN SATURATION: 95 % | SYSTOLIC BLOOD PRESSURE: 126 MMHG | RESPIRATION RATE: 16 BRPM

## 2024-04-18 DIAGNOSIS — M25.512 LEFT SHOULDER PAIN, UNSPECIFIED CHRONICITY: Primary | ICD-10-CM

## 2024-04-18 DIAGNOSIS — M25.512 LEFT SHOULDER PAIN, UNSPECIFIED CHRONICITY: ICD-10-CM

## 2024-04-18 PROCEDURE — 73030 X-RAY EXAM OF SHOULDER: CPT | Mod: TC | Performed by: RADIOLOGY

## 2024-04-18 PROCEDURE — 99213 OFFICE O/P EST LOW 20 MIN: CPT | Performed by: FAMILY MEDICINE

## 2024-04-18 RX ORDER — ACETAMINOPHEN 500 MG
500-1000 TABLET ORAL EVERY 6 HOURS PRN
Qty: 60 TABLET | Refills: 0 | Status: SHIPPED | OUTPATIENT
Start: 2024-04-18 | End: 2024-08-01

## 2024-04-18 NOTE — PROGRESS NOTES
"  Assessment & Plan     Left shoulder pain, unspecified chronicity  Adhesive capsulitis. Began 2 weeks ago. Not diabetic. ROM severely restricted.   - XR Shoulder Left 2 Views; Future  - diclofenac (VOLTAREN) 1 % topical gel; Apply 2 g topically 4 times daily  - acetaminophen (TYLENOL) 500 MG tablet; Take 1-2 tablets (500-1,000 mg) by mouth every 6 hours as needed for mild pain  - Orthopedic  Referral; Future            BMI  Estimated body mass index is 25.11 kg/m  as calculated from the following:    Height as of 6/15/23: 1.422 m (4' 8\").    Weight as of this encounter: 50.8 kg (112 lb).         MEDICATIONS:  Continue current medications without change    No follow-ups on file.    Vish Wiggins is a 78 year old, presenting for the following health issues:  Shoulder Pain (Left shoulder pain unable to raise arm up. Hard to dress herself started about 1 month ago.)    HPI               Review of Systems  Constitutional, HEENT, cardiovascular, pulmonary, gi and gu systems are negative, except as otherwise noted.      Objective    /73   Pulse 68   Temp 98.2  F (36.8  C)   Resp 16   Wt 50.8 kg (112 lb)   SpO2 95%   BMI 25.11 kg/m    Body mass index is 25.11 kg/m .  Physical Exam   GENERAL: alert and no distress  NECK: no adenopathy, no asymmetry, masses, or scars  RESP: lungs clear to auscultation - no rales, rhonchi or wheezes  CV: regular rate and rhythm, normal S1 S2, no S3 or S4, no murmur, click or rub, no peripheral edema  ABDOMEN: soft, nontender, no hepatosplenomegaly, no masses and bowel sounds normal  ORTHO:   SHOULDER Exam-Left   Inspection: no swelling, no bruising, no discoloration, no obvious deformity, no asymmetry, no glenohumeral joint anterior bulge, no distal clavicle elevation, no muscle atrophy, no scapular winging   Tenderness of: SC joint- no, clavicle(prox-mid)- no, clavicle-(mid-distal)- no, AC joint- no, acromion- no, anterior capsule- no, prox bicep tendon- no, greater " tuberosity- no, prox humerus- no, supraspinatous- no, infraspinatous- no, superior trapezious- no, rhomboids- no   Range of Motion: Active- forward flexion- 30 degrees, abduction- 30 degrees, external rotation- 30 degrees, internal rotation-   Range of Motion: Passive- forward flexion- 30 degrees, abduction- 30 degrees, external rotation- 30 degrees, internal rotation-    Strength: Overall diminished    Special tests:           No results found for this or any previous visit (from the past 24 hour(s)).        Signed Electronically by: Shana Hahn MD
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
## 2024-04-26 ENCOUNTER — OFFICE VISIT (OUTPATIENT)
Dept: ORTHOPEDICS | Facility: CLINIC | Age: 79
End: 2024-04-26
Attending: FAMILY MEDICINE
Payer: COMMERCIAL

## 2024-04-26 DIAGNOSIS — M19.012 PRIMARY OSTEOARTHRITIS OF LEFT SHOULDER: Primary | ICD-10-CM

## 2024-04-26 DIAGNOSIS — M25.512 LEFT SHOULDER PAIN, UNSPECIFIED CHRONICITY: ICD-10-CM

## 2024-04-26 PROCEDURE — 99204 OFFICE O/P NEW MOD 45 MIN: CPT | Performed by: STUDENT IN AN ORGANIZED HEALTH CARE EDUCATION/TRAINING PROGRAM

## 2024-04-26 NOTE — PATIENT INSTRUCTIONS
1. Primary osteoarthritis of left shoulder    2. Left shoulder pain, unspecified chronicity      Discussed shoulder pain and weakness on the left side.  X-rays were reviewed today showing severe degenerative arthritis which is mainly the main cause of pain and weakness. Discussed options including surgery and more conservative nonsurgical options.  Would prefer surgery as the last option so will  trial cortisone injection and strengthening physical therapy to start.  Discussed due severe symptoms,  changes on Xrays and restricted range of motion will try to improve symptoms to reduce pain and weakness with conservative care however If no improvement with cortisone injection may need to consider surgery in future.   PLAN:   - will order physical therapy. Call to schedule.  Try to schedule same day as cortisone injection to help with transportation to and from [facility].  For scheduled physical therapy and then schedule follow-up appointment with Dr. Son for procedure visit for ultrasound guided shoulder cortisone injection  -Can continue Tylenol 500-1000mg (up to three times per day)  -Can continue Voltaren gel to area of up to three times per day as needed. Can get over the counter at drug store.       Please call [phone]  Ask for my team if you have any questions or concerns    Kim Son DO  [facility]      Thank you for choosing [facility]!    CLINIC LOCATIONS:     [place]  TRIAGE LINE: [phone] [address] [facility] APPOINTMENTS: [phone]   [place] [address] RADIOLOGY: [phone]   (Monday, Thursday & Friday) PHYSICAL THERAPY: [phone]    BILLING QUESTIONS: [phone]   [place] FAX: [phone] [id number]    [place] [address]    (Wednesday)

## 2024-04-26 NOTE — PROGRESS NOTES
Sports Medicine Procedure Note    Diagnoses and all orders for this visit:    Primary osteoarthritis of left shoulder  -     Large Joint Injection/Arthocentesis: L glenohumeral joint        Rosalie Carreno is a/an 79 year old female who is seen for Corticosteroid injection of left shoulder  Last injection: never    -Injection tolerated well.   -Will continue to physical therapy and follow-up as needed. Discussed earliest can repeat injection in three months. If no improvement can still consider surgical referral to help with pain control in future.   -Post-injection instructions were provided to the patient  -Return sooner if develops new or worsening symptoms.    Options for treatment and/or follow-up care were reviewed with the patient was actively involved in the decision making process. Patient verbalized understanding and was in agreement with the plan.    Agreeable to CSI injection injection. Discussed risks and benefits. they are aware of risks such as skin hypopigmentation, hyperglycemia, bleeding, and infection.     Large Joint Injection/Arthocentesis: L glenohumeral joint    Date/Time: 5/2/2024 9:28 AM    Performed by: Kim Son DO  Authorized by: Kim Son DO    Indications:  Osteoarthritis and pain  Needle Size:  22 G  Guidance: ultrasound    Approach:  Posterior  Location:  Shoulder      Site:  L glenohumeral joint  Medications:  40 mg triamcinolone 40 MG/ML; 4 mL ROPivacaine 5 MG/ML; 3 mL lidocaine 1 %  Outcome:  Tolerated well, no immediate complications  Procedure discussed: discussed risks, benefits, and alternatives    Consent Given by:  Patient  Timeout: timeout called immediately prior to procedure    Prep: patient was prepped and draped in usual sterile fashion     Ultrasound was used to ensure safe and accurate needle placement and injection. Ultrasound images of the procedure were permanently stored.      Follow-up as needed if pain returns or not improved.       Kim Son  DO  [facility] SPORTS MEDICINE CLINIC [facility]        Post-Injection Discharge Instructions    You may shower, however avoid swimming, tub baths or hot tubs for 24 hours following your procedure  You may have a mild to moderate increase in pain for a few days following the injection.  The lidocaine (local numbing medicine) will wear off in several hours. It usually takes 3-5 days for the steroid medication to start working although it may take up to 14 days for full effect.   You may use ice packs for 10-15 minutes, 3 to 4 times a day at the injection site for comfort if needed  You may use extra strength Tylenol for pain control if necessary   If you were fasting, you may resume your normal diet and medications after the procedure  If you have diabetes, your blood sugar may be higher than normal for 10-14 days following a steroid injection. Contact your doctor who manages your diabetes if your blood sugar is significantly higher than usual    If you experience any of the following, call Sports Medicine @ [phone] or [phone]  -Fever over 100 degrees F  -Swelling, bleeding, redness, drainage, warmth at the injection site  -New or significant worsening pain        Dr. Kim Son,   [facility]  Sports Medicine
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
## 2024-05-02 ENCOUNTER — OFFICE VISIT (OUTPATIENT)
Dept: ORTHOPEDICS | Facility: CLINIC | Age: 79
End: 2024-05-02
Payer: COMMERCIAL

## 2024-05-02 DIAGNOSIS — M19.012 PRIMARY OSTEOARTHRITIS OF LEFT SHOULDER: Primary | ICD-10-CM

## 2024-05-02 PROCEDURE — 99207 PR DROP WITH A PROCEDURE: CPT | Performed by: STUDENT IN AN ORGANIZED HEALTH CARE EDUCATION/TRAINING PROGRAM

## 2024-05-02 PROCEDURE — T1013 SIGN LANG/ORAL INTERPRETER: HCPCS | Mod: U4 | Performed by: INTERPRETER

## 2024-05-02 PROCEDURE — 20611 DRAIN/INJ JOINT/BURSA W/US: CPT | Mod: LT | Performed by: STUDENT IN AN ORGANIZED HEALTH CARE EDUCATION/TRAINING PROGRAM

## 2024-05-02 RX ORDER — TRIAMCINOLONE ACETONIDE 40 MG/ML
40 INJECTION, SUSPENSION INTRA-ARTICULAR; INTRAMUSCULAR
Status: SHIPPED | OUTPATIENT
Start: 2024-05-02

## 2024-05-02 RX ORDER — LIDOCAINE HYDROCHLORIDE 10 MG/ML
3 INJECTION, SOLUTION INFILTRATION; PERINEURAL
Status: SHIPPED | OUTPATIENT
Start: 2024-05-02

## 2024-05-02 RX ORDER — ROPIVACAINE HYDROCHLORIDE 5 MG/ML
4 INJECTION, SOLUTION EPIDURAL; INFILTRATION; PERINEURAL
Status: SHIPPED | OUTPATIENT
Start: 2024-05-02

## 2024-05-02 RX ADMIN — LIDOCAINE HYDROCHLORIDE 3 ML: 10 INJECTION, SOLUTION INFILTRATION; PERINEURAL at 09:28

## 2024-05-02 RX ADMIN — TRIAMCINOLONE ACETONIDE 40 MG: 40 INJECTION, SUSPENSION INTRA-ARTICULAR; INTRAMUSCULAR at 09:28

## 2024-05-02 RX ADMIN — ROPIVACAINE HYDROCHLORIDE 4 ML: 5 INJECTION, SOLUTION EPIDURAL; INFILTRATION; PERINEURAL at 09:28

## 2024-05-02 NOTE — LETTER
5/2/2024         RE: Rosalie Carreno  [address] [place] [address]        Dear Colleague,    Thank you for referring your patient, Rosalie Carreno, to the [facility] SPORTS MEDICINE CLINIC [facility]. Please see a copy of my visit note below.    Sports Medicine Procedure Note    Diagnoses and all orders for this visit:    Primary osteoarthritis of left shoulder  -     Large Joint Injection/Arthocentesis: L glenohumeral joint        Rosalie Carreno is a/an 79 year old female who is seen for Corticosteroid injection of left shoulder  Last injection: never    -Injection tolerated well.   -Will continue to physical therapy and follow-up as needed. Discussed earliest can repeat injection in three months. If no improvement can still consider surgical referral to help with pain control in future.   -Post-injection instructions were provided to the patient  -Return sooner if develops new or worsening symptoms.    Options for treatment and/or follow-up care were reviewed with the patient was actively involved in the decision making process. Patient verbalized understanding and was in agreement with the plan.    Agreeable to CSI injection injection. Discussed risks and benefits. they are aware of risks such as skin hypopigmentation, hyperglycemia, bleeding, and infection.     Large Joint Injection/Arthocentesis: L glenohumeral joint    Date/Time: 5/2/2024 9:28 AM    Performed by: Kim Son DO  Authorized by: Kim Son DO    Indications:  Osteoarthritis and pain  Needle Size:  22 G  Guidance: ultrasound    Approach:  Posterior  Location:  Shoulder      Site:  L glenohumeral joint  Medications:  40 mg triamcinolone 40 MG/ML; 4 mL ROPivacaine 5 MG/ML; 3 mL lidocaine 1 %  Outcome:  Tolerated well, no immediate complications  Procedure discussed: discussed risks, benefits, and alternatives    Consent Given by:  Patient  Timeout: timeout called immediately prior to procedure    Prep: patient was  prepped and draped in usual sterile fashion     Ultrasound was used to ensure safe and accurate needle placement and injection. Ultrasound images of the procedure were permanently stored.      Follow-up as needed if pain returns or not improved.       Kim Son DO  [facility] SPORTS MEDICINE [facility]        Post-Injection Discharge Instructions    You may shower, however avoid swimming, tub baths or hot tubs for 24 hours following your procedure  You may have a mild to moderate increase in pain for a few days following the injection.  The lidocaine (local numbing medicine) will wear off in several hours. It usually takes 3-5 days for the steroid medication to start working although it may take up to 14 days for full effect.   You may use ice packs for 10-15 minutes, 3 to 4 times a day at the injection site for comfort if needed  You may use extra strength Tylenol for pain control if necessary   If you were fasting, you may resume your normal diet and medications after the procedure  If you have diabetes, your blood sugar may be higher than normal for 10-14 days following a steroid injection. Contact your doctor who manages your diabetes if your blood sugar is significantly higher than usual    If you experience any of the following, call Sports Medicine @ [phone] or [phone]  -Fever over 100 degrees F  -Swelling, bleeding, redness, drainage, warmth at the injection site  -New or significant worsening pain        Dr. Kim Son DO  [facility]  Sports Medicine       Again, thank you for allowing me to participate in the care of your patient.        Sincerely,        Kim Son DO

## 2024-05-02 NOTE — PATIENT INSTRUCTIONS
Injection Discharge Instructions    You may shower, however avoid swimming, tub baths or hot tubs for 24 hours following your procedure  You may have a mild to moderate increase in pain for several days following the injection.  It may take up to 14 days for the steroid medication to start working although you may feel the effect as early as a few days after the procedure.  You may use ice packs for 10-15 minutes, 3 to 4 times a day at the injection site for comfort  You may use anti-inflammatory medications (such as Ibuprofen or Aleve or Advil) or Tylenol for pain control if necessary  If you were fasting, you may resume your normal diet and medications after the procedure  If you have diabetes, check your blood sugar more frequently than usual as your blood sugar may be higher than normal for 10-14 days following a steroid injection. Contact your doctor who manages your diabetes if your blood sugar is higher than usual    If you experience any of the following, call  @ [phone] or [phone]  -Fever over 100 degree F  -Swelling, bleeding, redness, drainage, warmth at the injection site  - New or worsening pain     1. Primary osteoarthritis of left shoulder          Please call [phone]  Ask for my team if you have any questions or concerns    Kim Son DO  [facility]      Thank you for choosing [facility]!    CLINIC LOCATIONS:     [facility]  TRIAGE LINE: [phone]   [facility] APPOINTMENTS: [phone]   [place] [address] RADIOLOGY: [phone]   (Monday, Thursday & Friday) PHYSICAL THERAPY: [phone]    BILLING QUESTIONS: [phone]   [place] FAX: [phone] [id number]    [place] [address]    (Wednesday)

## 2024-05-23 ENCOUNTER — THERAPY VISIT (OUTPATIENT)
Dept: PHYSICAL THERAPY | Facility: REHABILITATION | Age: 79
End: 2024-05-23
Attending: STUDENT IN AN ORGANIZED HEALTH CARE EDUCATION/TRAINING PROGRAM
Payer: COMMERCIAL

## 2024-05-23 DIAGNOSIS — M19.012 PRIMARY OSTEOARTHRITIS OF LEFT SHOULDER: ICD-10-CM

## 2024-05-23 PROCEDURE — 97161 PT EVAL LOW COMPLEX 20 MIN: CPT | Mod: GP | Performed by: PHYSICAL THERAPIST

## 2024-05-23 PROCEDURE — 97110 THERAPEUTIC EXERCISES: CPT | Mod: GP | Performed by: PHYSICAL THERAPIST
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
## 2024-06-06 ENCOUNTER — THERAPY VISIT (OUTPATIENT)
Dept: PHYSICAL THERAPY | Facility: REHABILITATION | Age: 79
End: 2024-06-06
Payer: COMMERCIAL

## 2024-06-06 DIAGNOSIS — M19.012 PRIMARY OSTEOARTHRITIS OF LEFT SHOULDER: Primary | ICD-10-CM

## 2024-06-06 PROCEDURE — 97140 MANUAL THERAPY 1/> REGIONS: CPT | Mod: GP

## 2024-06-06 PROCEDURE — 97110 THERAPEUTIC EXERCISES: CPT | Mod: GP

## 2024-06-13 ENCOUNTER — THERAPY VISIT (OUTPATIENT)
Dept: PHYSICAL THERAPY | Facility: REHABILITATION | Age: 79
End: 2024-06-13
Payer: COMMERCIAL

## 2024-06-13 DIAGNOSIS — M19.012 PRIMARY OSTEOARTHRITIS OF LEFT SHOULDER: Primary | ICD-10-CM

## 2024-06-13 PROCEDURE — 97140 MANUAL THERAPY 1/> REGIONS: CPT | Mod: GP | Performed by: PHYSICAL THERAPIST

## 2024-06-13 PROCEDURE — 97110 THERAPEUTIC EXERCISES: CPT | Mod: GP | Performed by: PHYSICAL THERAPIST

## 2024-06-14 ENCOUNTER — APPOINTMENT (OUTPATIENT)
Dept: INTERPRETER SERVICES | Facility: CLINIC | Age: 79
End: 2024-06-14
Payer: COMMERCIAL

## 2024-06-28 ENCOUNTER — THERAPY VISIT (OUTPATIENT)
Dept: PHYSICAL THERAPY | Facility: REHABILITATION | Age: 79
End: 2024-06-28
Payer: COMMERCIAL

## 2024-06-28 DIAGNOSIS — M19.012 PRIMARY OSTEOARTHRITIS OF LEFT SHOULDER: Primary | ICD-10-CM

## 2024-06-28 PROCEDURE — 97110 THERAPEUTIC EXERCISES: CPT | Mod: GP | Performed by: PHYSICAL THERAPIST

## 2024-06-28 PROCEDURE — 97140 MANUAL THERAPY 1/> REGIONS: CPT | Mod: GP | Performed by: PHYSICAL THERAPIST

## 2024-07-05 ENCOUNTER — THERAPY VISIT (OUTPATIENT)
Dept: PHYSICAL THERAPY | Facility: REHABILITATION | Age: 79
End: 2024-07-05
Payer: COMMERCIAL

## 2024-07-05 DIAGNOSIS — M19.012 PRIMARY OSTEOARTHRITIS OF LEFT SHOULDER: Primary | ICD-10-CM

## 2024-07-05 PROCEDURE — 97110 THERAPEUTIC EXERCISES: CPT | Mod: GP | Performed by: PHYSICAL THERAPIST

## 2024-07-05 PROCEDURE — 97140 MANUAL THERAPY 1/> REGIONS: CPT | Mod: GP | Performed by: PHYSICAL THERAPIST

## 2024-07-05 NOTE — PROGRESS NOTES
DISCHARGE  Reason for Discharge: pt chose to discharge and will return to MD for another cortisone injections    Equipment Issued:     Discharge Plan: Patient to continue home program.    Referring Provider:  Kim Son       07/05/24 0500   Appointment Info   Signing clinician's name / credentials Radha avitia PT   Total/Authorized Visits E&T (12 planned)   Visits Used 5   Medical Diagnosis Primary osteoarthritis of left shoulder   PT Tx Diagnosis Primary osteoarthritis of left shoulder   Quick Adds Certification   Progress Note/Certification   Start of Care Date 05/23/24   Onset of illness/injury or Date of Surgery 01/18/22   Therapy Frequency 1x/week   Predicted Duration 90 days   Certification date from 05/23/24   Certification date to 08/20/24   Progress Note Due Date 07/22/24   Progress Note Completed Date 05/23/24       Present Yes  (niece present to interpret vs ipad)    Language ong    ID or First/Last Name [id number]   GOALS   PT Goals 2   PT Goal 1   Goal Identifier L arm use   Goal Description Pt will demonstrate an increased toleratanec to use of the left arm without pain in order to complete cooking tasks ans use LUE more throughout the day   Rationale to maximize safety and independence with performance of ADLs and functional tasks;to maximize safety and independence within the home;to maximize safety and independence with self cares   Goal Progress partially met.  Pt feels she can do some tasks for a slightly longer amount of time but not to where she wants.   Target Date 08/20/24   PT Goal 2   Goal Identifier L shoulder AROM   Goal Description Pt will be able to achieve >120 degrees of active shoulder ROM in order to increased overall functional mobility for reaching and self cares   Goal Progress patirally met.  Pt feels a lot of her motion remains the same as she does her activities.  Pt's AROM remains limited.   Target Date 08/20/24    Subjective Report   Subjective Report It is a little better but not as much as I would expect or hope.  I think I want today to be my last visit.   Treatment Interventions (PT)   Interventions Therapeutic Procedure/Exercise;Manual Therapy   Therapeutic Procedure/Exercise   Therapeutic Procedures: strength, endurance, ROM, flexibility minutes (34915) 15   Therapeutic Procedures Ther Proc 2;Ther Proc 3   Ther Proc 2 AAROM w/ wand: flex x 10 reps, verbal and tactile cues for proper form-doing in standing   Ther Proc 2 - Details doorway pec stretch: x 30 second holds, cues for shoulders parallel to doorframe   Ther Proc 3 wall slides: x 12 reps   PTRx Ther Proc 1 Wand Shoulder Abduction Standing   PTRx Ther Proc 1 - Details x 10 reps with increase ROM, cues for proper form   PTRx Ther Proc 2 Scapular Retraction/Depression   PTRx Ther Proc 2 - Details x 10 reps; reviewed   PTRx Ther Proc 3 Shoulder Scapular Retraction with Tubing   PTRx Ther Proc 3 - Details x 10 reps YTB   Skilled Intervention discussed HEP, goal review   Manual Therapy   Manual Therapy: Mobilization, MFR, MLD, friction massage minutes (66343) 25   Manual Therapy Manual Therapy 2   Manual Therapy 1 MFR, STM to pts L UT, deltoid, biceps, triceps in supine   Manual Therapy 1 - Details Inferior distraction: x 3 sets x 10-20 second holds   Manual Therapy 2 L shoulder PROM: flex, ABD, IR/ER in supine   Skilled Intervention MT for release of taught bands   Patient Response/Progress pt was not able to say if manual therapy was helpful or not.  some of it was painful and some was not   Education   Learner/Method Patient;Family;Caregiver   Education Comments Language barrier   Plan   Home program See PTRX   Plan for next session pt is discharged   Comments   Comments Assessment: Pt returns to PT for left shoulder pain.She is disappointed that she continues to have pain and can't do tasks how she wants.  She canceled her last two visits and will schedule  another cortisone injection with her MD, per pt's daughter who is with her today.  She would like to be done with therapy.  Able to review exercises briefly and pt did not have any questions.  She felt at times the manual therapy felt good and other times it was a little painful.  Pt is discharged with partial goals met

## 2024-07-30 NOTE — PROGRESS NOTES
Sports Medicine Procedure Note    Rosalie was seen today for pain and follow up.    Diagnoses and all orders for this visit:    Primary osteoarthritis of left shoulder  -     Large Joint Injection/Arthocentesis: L glenohumeral joint        Rosalie Carreno is a/an 79 year old female who is seen for Corticosteroid injection of Left GHJ.   Last injection: 5/2/24, 2.5 months relief or more    -presenting for left glenohumeral injection last completed 5/2/2024. Has been completing physical therapy  -Severe end stage osteoarthritis. Would still like to continue conservative mangement with injections and PT for pain control. Can refer for surgical referral in future but deferred at this time.   -Post-injection instructions were provided to the patient  -Return sooner if develops new or worsening symptoms.    A1c labs reviewed  Lab Results   Component Value Date    A1C 5.8 08/01/2024    A1C 5.9 06/05/2017         Options for treatment and/or follow-up care were reviewed with the patient was actively involved in the decision making process. Patient verbalized understanding and was in agreement with the plan.    Agreeable to injection injection. Discussed risks and benefits. they are aware of risks such as skin hypopigmentation, hyperglycemia, bleeding, and infection.       Follow-up as needed for repeat injections every 3-4 months as needed.     Large Joint Injection/Arthocentesis: L glenohumeral joint    Date/Time: 8/8/2024 11:42 AM    Performed by: Kim Son DO  Authorized by: Kim Son DO    Indications:  Osteoarthritis and pain  Needle Size:  22 G  Guidance: ultrasound    Approach:  Posterior  Location:  Shoulder      Site:  L glenohumeral joint  Medications:  40 mg triamcinolone 40 MG/ML; 3 mL lidocaine 1 %  Medications comment:  4 ml 0.5% Ropivacaine NDC 36509-129-16, Lot# [id number], Expires 02/2025  Outcome:  Tolerated well, no immediate complications  Procedure discussed: discussed risks, benefits, and  alternatives    Consent Given by:  Patient  Timeout: timeout called immediately prior to procedure    Prep: patient was prepped and draped in usual sterile fashion     Ultrasound was used to ensure safe and accurate needle placement and injection. Ultrasound images of the procedure were permanently stored.        Kim Son DO  [facility]        Post-Injection Discharge Instructions    You may shower, however avoid swimming, tub baths or hot tubs for 24 hours following your procedure  You may have a mild to moderate increase in pain for a few days following the injection.  The lidocaine (local numbing medicine) will wear off in several hours. It usually takes 3-5 days for the steroid medication to start working although it may take up to 14 days for full effect.   You may use ice packs for 10-15 minutes, 3 to 4 times a day at the injection site for comfort if needed  You may use extra strength Tylenol for pain control if necessary   If you were fasting, you may resume your normal diet and medications after the procedure  If you have diabetes, your blood sugar may be higher than normal for 10-14 days following a steroid injection. Contact your doctor who manages your diabetes if your blood sugar is significantly higher than usual    If you experience any of the following, call Sports Medicine @ [phone] or [phone]  -Fever over 100 degrees F  -Swelling, bleeding, redness, drainage, warmth at the injection site  -New or significant worsening pain        Dr. Kim Son,   [facility]  Sports Medicine

## 2024-08-01 ENCOUNTER — DOCUMENTATION ONLY (OUTPATIENT)
Dept: FAMILY MEDICINE | Facility: CLINIC | Age: 79
End: 2024-08-01

## 2024-08-01 ENCOUNTER — OFFICE VISIT (OUTPATIENT)
Dept: FAMILY MEDICINE | Facility: CLINIC | Age: 79
End: 2024-08-01
Payer: COMMERCIAL

## 2024-08-01 VITALS
HEART RATE: 66 BPM | OXYGEN SATURATION: 96 % | DIASTOLIC BLOOD PRESSURE: 66 MMHG | WEIGHT: 116 LBS | BODY MASS INDEX: 26.85 KG/M2 | SYSTOLIC BLOOD PRESSURE: 127 MMHG | RESPIRATION RATE: 16 BRPM | TEMPERATURE: 98.3 F | HEIGHT: 55 IN

## 2024-08-01 DIAGNOSIS — R53.82 CHRONIC FATIGUE: ICD-10-CM

## 2024-08-01 DIAGNOSIS — G89.29 CHRONIC PAIN OF BOTH SHOULDERS: Primary | ICD-10-CM

## 2024-08-01 DIAGNOSIS — G47.01 INSOMNIA DUE TO MEDICAL CONDITION: ICD-10-CM
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
## 2024-08-01 DIAGNOSIS — R10.13 ABDOMINAL PAIN, EPIGASTRIC: Primary | ICD-10-CM

## 2024-08-01 DIAGNOSIS — M25.512 CHRONIC PAIN OF BOTH SHOULDERS: Primary | ICD-10-CM

## 2024-08-01 DIAGNOSIS — E87.6 HYPOKALEMIA: ICD-10-CM

## 2024-08-01 DIAGNOSIS — M25.511 CHRONIC PAIN OF BOTH SHOULDERS: Primary | ICD-10-CM

## 2024-08-01 DIAGNOSIS — R73.03 PREDIABETES: ICD-10-CM

## 2024-08-01 LAB
ALBUMIN SERPL BCG-MCNC: 4.1 G/DL (ref 3.5–5.2)
ALP SERPL-CCNC: 57 U/L (ref 40–150)
ALT SERPL W P-5'-P-CCNC: 11 U/L (ref 0–50)
ANION GAP SERPL CALCULATED.3IONS-SCNC: 12 MMOL/L (ref 7–15)
AST SERPL W P-5'-P-CCNC: 20 U/L (ref 0–45)
BASOPHILS # BLD AUTO: 0 10E3/UL (ref 0–0.2)
BASOPHILS NFR BLD AUTO: 0 %
BILIRUB SERPL-MCNC: 0.5 MG/DL
BUN SERPL-MCNC: 18.5 MG/DL (ref 8–23)
CALCIUM SERPL-MCNC: 8.6 MG/DL (ref 8.8–10.4)
CHLORIDE SERPL-SCNC: 108 MMOL/L (ref 98–107)
CREAT SERPL-MCNC: 0.55 MG/DL (ref 0.51–0.95)
EGFRCR SERPLBLD CKD-EPI 2021: >90 ML/MIN/1.73M2
EOSINOPHIL # BLD AUTO: 0 10E3/UL (ref 0–0.7)
EOSINOPHIL NFR BLD AUTO: 0 %
ERYTHROCYTE [DISTWIDTH] IN BLOOD BY AUTOMATED COUNT: 12.5 % (ref 10–15)
GLUCOSE SERPL-MCNC: 151 MG/DL (ref 70–99)
HBA1C MFR BLD: 5.8 % (ref 0–5.6)
HCO3 SERPL-SCNC: 23 MMOL/L (ref 22–29)
HCT VFR BLD AUTO: 39.1 % (ref 35–47)
HGB BLD-MCNC: 12.4 G/DL (ref 11.7–15.7)
IMM GRANULOCYTES # BLD: 0 10E3/UL
IMM GRANULOCYTES NFR BLD: 0 %
LYMPHOCYTES # BLD AUTO: 1.4 10E3/UL (ref 0.8–5.3)
LYMPHOCYTES NFR BLD AUTO: 19 %
MCH RBC QN AUTO: 31.7 PG (ref 26.5–33)
MCHC RBC AUTO-ENTMCNC: 31.7 G/DL (ref 31.5–36.5)
MCV RBC AUTO: 100 FL (ref 78–100)
MONOCYTES # BLD AUTO: 0.4 10E3/UL (ref 0–1.3)
MONOCYTES NFR BLD AUTO: 5 %
NEUTROPHILS # BLD AUTO: 5.4 10E3/UL (ref 1.6–8.3)
NEUTROPHILS NFR BLD AUTO: 75 %
PLATELET # BLD AUTO: 202 10E3/UL (ref 150–450)
POTASSIUM SERPL-SCNC: 3.1 MMOL/L (ref 3.4–5.3)
PROT SERPL-MCNC: 6.5 G/DL (ref 6.4–8.3)
RBC # BLD AUTO: 3.91 10E6/UL (ref 3.8–5.2)
SODIUM SERPL-SCNC: 143 MMOL/L (ref 135–145)
TSH SERPL DL<=0.005 MIU/L-ACNC: 0.37 UIU/ML (ref 0.3–4.2)
WBC # BLD AUTO: 7.2 10E3/UL (ref 4–11)

## 2024-08-01 PROCEDURE — 84443 ASSAY THYROID STIM HORMONE: CPT | Performed by: FAMILY MEDICINE

## 2024-08-01 PROCEDURE — 99213 OFFICE O/P EST LOW 20 MIN: CPT | Performed by: FAMILY MEDICINE

## 2024-08-01 PROCEDURE — 99214 OFFICE O/P EST MOD 30 MIN: CPT | Mod: 25 | Performed by: FAMILY MEDICINE

## 2024-08-01 PROCEDURE — 36415 COLL VENOUS BLD VENIPUNCTURE: CPT | Performed by: FAMILY MEDICINE

## 2024-08-01 PROCEDURE — 83036 HEMOGLOBIN GLYCOSYLATED A1C: CPT | Performed by: FAMILY MEDICINE

## 2024-08-01 PROCEDURE — 85025 COMPLETE CBC W/AUTO DIFF WBC: CPT | Performed by: FAMILY MEDICINE

## 2024-08-01 PROCEDURE — 80053 COMPREHEN METABOLIC PANEL: CPT | Performed by: FAMILY MEDICINE

## 2024-08-01 RX ORDER — PANTOPRAZOLE SODIUM 20 MG/1
20 TABLET, DELAYED RELEASE ORAL DAILY PRN
Qty: 30 TABLET | Refills: 3 | Status: SHIPPED | OUTPATIENT
Start: 2024-08-01 | End: 2024-09-16

## 2024-08-01 RX ORDER — LANOLIN ALCOHOL/MO/W.PET/CERES
3 CREAM (GRAM) TOPICAL
Qty: 30 TABLET | Refills: 5 | Status: SHIPPED | OUTPATIENT
Start: 2024-08-01 | End: 2024-09-16

## 2024-08-01 RX ORDER — ACETAMINOPHEN 500 MG
500-1000 TABLET ORAL EVERY 6 HOURS PRN
Qty: 100 TABLET | Refills: 3 | Status: SHIPPED | OUTPATIENT
Start: 2024-08-01

## 2024-08-01 ASSESSMENT — PATIENT HEALTH QUESTIONNAIRE - PHQ9
SUM OF ALL RESPONSES TO PHQ QUESTIONS 1-9: 13
SUM OF ALL RESPONSES TO PHQ QUESTIONS 1-9: 13
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
## 2024-08-01 NOTE — PROGRESS NOTES
Application for disability parking certificate was fill out and given patient.     Copy made and place in medical record

## 2024-08-02 ENCOUNTER — APPOINTMENT (OUTPATIENT)
Dept: INTERPRETER SERVICES | Facility: CLINIC | Age: 79
End: 2024-08-02
Payer: COMMERCIAL

## 2024-08-02 RX ORDER — POTASSIUM CHLORIDE 1.5 G/1.58G
20 POWDER, FOR SOLUTION ORAL DAILY
Qty: 5 PACKET | Refills: 0 | Status: SHIPPED | OUTPATIENT
Start: 2024-08-02 | End: 2024-08-07

## 2024-08-05 ENCOUNTER — APPOINTMENT (OUTPATIENT)
Dept: INTERPRETER SERVICES | Facility: CLINIC | Age: 79
End: 2024-08-05
Payer: COMMERCIAL

## 2024-08-08 ENCOUNTER — OFFICE VISIT (OUTPATIENT)
Dept: ORTHOPEDICS | Facility: CLINIC | Age: 79
End: 2024-08-08
Payer: COMMERCIAL

## 2024-08-08 DIAGNOSIS — M19.012 PRIMARY OSTEOARTHRITIS OF LEFT SHOULDER: Primary | ICD-10-CM

## 2024-08-08 PROCEDURE — 99207 PR DROP WITH A PROCEDURE: CPT | Performed by: STUDENT IN AN ORGANIZED HEALTH CARE EDUCATION/TRAINING PROGRAM

## 2024-08-08 PROCEDURE — T1013 SIGN LANG/ORAL INTERPRETER: HCPCS | Mod: U4

## 2024-08-08 PROCEDURE — 20611 DRAIN/INJ JOINT/BURSA W/US: CPT | Mod: LT | Performed by: STUDENT IN AN ORGANIZED HEALTH CARE EDUCATION/TRAINING PROGRAM

## 2024-08-08 RX ORDER — LIDOCAINE HYDROCHLORIDE 10 MG/ML
3 INJECTION, SOLUTION INFILTRATION; PERINEURAL
Status: SHIPPED | OUTPATIENT
Start: 2024-08-08

## 2024-08-08 RX ORDER — TRIAMCINOLONE ACETONIDE 40 MG/ML
40 INJECTION, SUSPENSION INTRA-ARTICULAR; INTRAMUSCULAR
Status: SHIPPED | OUTPATIENT
Start: 2024-08-08

## 2024-08-08 RX ADMIN — LIDOCAINE HYDROCHLORIDE 3 ML: 10 INJECTION, SOLUTION INFILTRATION; PERINEURAL at 11:42

## 2024-08-08 RX ADMIN — TRIAMCINOLONE ACETONIDE 40 MG: 40 INJECTION, SUSPENSION INTRA-ARTICULAR; INTRAMUSCULAR at 11:42

## 2024-08-08 NOTE — LETTER
8/8/2024      Rosalie Carreno  [address] [place] [address]      Dear Colleague,    Thank you for referring your patient, Rosalie Carreno, to the [facility]. Please see a copy of my visit note below.    Sports Medicine Procedure Note    Rosalie was seen today for pain and follow up.    Diagnoses and all orders for this visit:    Primary osteoarthritis of left shoulder  -     Large Joint Injection/Arthocentesis: L glenohumeral joint        Rosalie Carreno is a/an 79 year old female who is seen for Corticosteroid injection of Left GHJ.   Last injection: 5/2/24, 2.5 months relief or more    -presenting for left glenohumeral injection last completed 5/2/2024. Has been completing physical therapy  -Severe end stage osteoarthritis. Would still like to continue conservative mangement with injections and PT for pain control. Can refer for surgical referral in future but deferred at this time.   -Post-injection instructions were provided to the patient  -Return sooner if develops new or worsening symptoms.    A1c labs reviewed  Lab Results   Component Value Date    A1C 5.8 08/01/2024    A1C 5.9 06/05/2017         Options for treatment and/or follow-up care were reviewed with the patient was actively involved in the decision making process. Patient verbalized understanding and was in agreement with the plan.    Agreeable to injection injection. Discussed risks and benefits. they are aware of risks such as skin hypopigmentation, hyperglycemia, bleeding, and infection.       Follow-up as needed for repeat injections every 3-4 months as needed.     Large Joint Injection/Arthocentesis: L glenohumeral joint    Date/Time: 8/8/2024 11:42 AM    Performed by: Kim Son DO  Authorized by: Kim Son DO    Indications:  Osteoarthritis and pain  Needle Size:  22 G  Guidance: ultrasound    Approach:  Posterior  Location:  Shoulder      Site:  L glenohumeral joint  Medications:  40  mg triamcinolone 40 MG/ML; 3 mL lidocaine 1 %  Medications comment:  4 ml 0.5% Ropivacaine NDC 64560-390-39, Lot# [id number], Expires 02/2025  Outcome:  Tolerated well, no immediate complications  Procedure discussed: discussed risks, benefits, and alternatives    Consent Given by:  Patient  Timeout: timeout called immediately prior to procedure    Prep: patient was prepped and draped in usual sterile fashion     Ultrasound was used to ensure safe and accurate needle placement and injection. Ultrasound images of the procedure were permanently stored.        Kim Son DO  [facility]IN[facility]        Post-Injection Discharge Instructions    You may shower, however avoid swimming, tub baths or hot tubs for 24 hours following your procedure  You may have a mild to moderate increase in pain for a few days following the injection.  The lidocaine (local numbing medicine) will wear off in several hours. It usually takes 3-5 days for the steroid medication to start working although it may take up to 14 days for full effect.   You may use ice packs for 10-15 minutes, 3 to 4 times a day at the injection site for comfort if needed  You may use extra strength Tylenol for pain control if necessary   If you were fasting, you may resume your normal diet and medications after the procedure  If you have diabetes, your blood sugar may be higher than normal for 10-14 days following a steroid injection. Contact your doctor who manages your diabetes if your blood sugar is significantly higher than usual    If you experience any of the following, call Sports Medicine @ [phone] or [phone]  -Fever over 100 degrees F  -Swelling, bleeding, redness, drainage, warmth at the injection site  -New or significant worsening pain        Dr. Kim Son, DO  [facility]  Sports Medicine       Again, thank you for allowing me to participate in the care of your patient.         Sincerely,        Kim Son, DO

## 2024-08-08 NOTE — PATIENT INSTRUCTIONS
Injection Discharge Instructions    You may shower, however avoid swimming, tub baths or hot tubs for 24 hours following your procedure  You may have a mild to moderate increase in pain for several days following the injection.  It may take up to 14 days for the steroid medication to start working although you may feel the effect as early as a few days after the procedure.  You may use ice packs for 10-15 minutes, 3 to 4 times a day at the injection site for comfort  You may use anti-inflammatory medications (such as Ibuprofen or Aleve or Advil) or Tylenol for pain control if necessary  If you were fasting, you may resume your normal diet and medications after the procedure  If you have diabetes, check your blood sugar more frequently than usual as your blood sugar may be higher than normal for 10-14 days following a steroid injection. Contact your doctor who manages your diabetes if your blood sugar is higher than usual    If you experience any of the following, call  @ [phone] or [phone]  -Fever over 100 degree F  -Swelling, bleeding, redness, drainage, warmth at the injection site  - New or worsening pain

## 2024-08-14 DIAGNOSIS — M25.511 CHRONIC PAIN OF BOTH SHOULDERS: ICD-10-CM

## 2024-08-14 DIAGNOSIS — M25.512 CHRONIC PAIN OF BOTH SHOULDERS: ICD-10-CM

## 2024-08-14 DIAGNOSIS — G89.29 CHRONIC PAIN OF BOTH SHOULDERS: ICD-10-CM

## 2024-08-14 RX ORDER — ACETAMINOPHEN 500 MG
500-1000 TABLET ORAL EVERY 6 HOURS PRN
Qty: 100 TABLET | OUTPATIENT
Start: 2024-08-14
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
## 2024-09-04 ENCOUNTER — APPOINTMENT (OUTPATIENT)
Dept: INTERPRETER SERVICES | Facility: CLINIC | Age: 79
End: 2024-09-04
Payer: COMMERCIAL

## 2024-09-16 ENCOUNTER — OFFICE VISIT (OUTPATIENT)
Dept: FAMILY MEDICINE | Facility: CLINIC | Age: 79
End: 2024-09-16
Payer: COMMERCIAL

## 2024-09-16 ENCOUNTER — TELEPHONE (OUTPATIENT)
Dept: FAMILY MEDICINE | Facility: CLINIC | Age: 79
End: 2024-09-16

## 2024-09-16 VITALS
OXYGEN SATURATION: 96 % | SYSTOLIC BLOOD PRESSURE: 111 MMHG | HEIGHT: 57 IN | BODY MASS INDEX: 24.59 KG/M2 | WEIGHT: 114 LBS | DIASTOLIC BLOOD PRESSURE: 68 MMHG | HEART RATE: 78 BPM

## 2024-09-16 DIAGNOSIS — Z00.00 WELLNESS EXAMINATION: Primary | ICD-10-CM

## 2024-09-16 DIAGNOSIS — Z23 NEED FOR INFLUENZA VACCINATION: ICD-10-CM

## 2024-09-16 DIAGNOSIS — M25.50 ARTHRALGIA, UNSPECIFIED JOINT: ICD-10-CM

## 2024-09-16 DIAGNOSIS — R41.89 IMPAIRED COGNITION: Primary | ICD-10-CM

## 2024-09-16 DIAGNOSIS — Z78.0 POST-MENOPAUSAL: ICD-10-CM

## 2024-09-16 DIAGNOSIS — Z00.00 ENCOUNTER FOR MEDICARE ANNUAL WELLNESS EXAM: Primary | ICD-10-CM

## 2024-09-16 DIAGNOSIS — E87.6 HYPOKALEMIA: ICD-10-CM

## 2024-09-16 DIAGNOSIS — R41.89 IMPAIRED COGNITION: ICD-10-CM

## 2024-09-16 PROCEDURE — 90662 IIV NO PRSV INCREASED AG IM: CPT | Performed by: FAMILY MEDICINE

## 2024-09-16 PROCEDURE — G0439 PPPS, SUBSEQ VISIT: HCPCS | Performed by: FAMILY MEDICINE

## 2024-09-16 PROCEDURE — 99213 OFFICE O/P EST LOW 20 MIN: CPT | Mod: 25 | Performed by: FAMILY MEDICINE

## 2024-09-16 PROCEDURE — 99207 PR NO BILLABLE SERVICE THIS VISIT: CPT

## 2024-09-16 PROCEDURE — G0008 ADMIN INFLUENZA VIRUS VAC: HCPCS | Performed by: FAMILY MEDICINE

## 2024-09-16 ASSESSMENT — PATIENT HEALTH QUESTIONNAIRE - PHQ9
SUM OF ALL RESPONSES TO PHQ QUESTIONS 1-9: 11
10. IF YOU CHECKED OFF ANY PROBLEMS, HOW DIFFICULT HAVE THESE PROBLEMS MADE IT FOR YOU TO DO YOUR WORK, TAKE CARE OF THINGS AT HOME, OR GET ALONG WITH OTHER PEOPLE: SOMEWHAT DIFFICULT
SUM OF ALL RESPONSES TO PHQ QUESTIONS 1-9: 11
10. IF YOU CHECKED OFF ANY PROBLEMS, HOW DIFFICULT HAVE THESE PROBLEMS MADE IT FOR YOU TO DO YOUR WORK, TAKE CARE OF THINGS AT HOME, OR GET ALONG WITH OTHER PEOPLE: SOMEWHAT DIFFICULT

## 2024-09-16 NOTE — TELEPHONE ENCOUNTER
Can we please reach out to Rosalie and/or her daughter with the following message:    - I reviewed her chart further after our visit had ended and noticed that she was recently treated for low potassium. I'd like to recheck her potassium level along with a few labs to further evaluate her memory concerns.   - Can we please arrange a lab-only follow up for these tests (orders placed today) in the next 1-2 weeks and help to set up a follow up visit in 4-8 weeks for her memory concerns with an adult relative present at the visit.     Thanks!    Meredith Morrison MD

## 2024-09-16 NOTE — PROGRESS NOTES
Preventive Care Visit  [facility]  April DEONDRELyla Morrison MD, Family Medicine  Sep 16, 2024      Assessment & Plan     Encounter for Medicare annual wellness exam  Here today for a routine preventive exam. We discussed the following issues.   - Encouraged RSV & Shingles vaccines at her pharmacy  - Follow up for Covid vaccine when available later this fall  - Flu shot given today  - No previous DEXA and open to doing this today.    Arthralgia, unspecified joint  Diffuse arthralgias, likely from age-related DJD. Recent shoulder pain from left shoulder OA is better after an intraarticular injection in August.   - Increase acetaminophen dose to 2 tablets TID.   - Okay to use topical diclofenac as needed in addition to this. Advised against regular oral NSAID use.   - Close follow up if not improving.    Impaired cognition  Failed mini-cog today (score of 0). History of cognitive dysfunction per her daughter, but history challenging given no accompanying adult with the patient at her visit today. Recent visits with insomnia and depressed mood may be contributing. Normal TSH in August 2024. No previous B12 level.  - Close follow up for dedicated visit for cognitive dysfunction with PCA or other family member to provide more information on specific concerns.   - MOCA or other cognitive test in follow up to further explore cognitive challenges.   - Would benefit from B12 testing with next blood draw.  - Further exploration of mood at follow up visit.    Post-menopausal  No previous DEXA screening after age 65 yr. Open to this today.  - DEXA HIP/PELVIS/SPINE - Future; Future    Need for influenza vaccination  Due for routine seasonal flu shot.  - INFLUENZA HIGH DOSE, TRIVALENT, PF (FLUZONE)    Depression Screening Follow Up        9/16/2024    10:03 AM   PHQ   PHQ-9 Total Score 11    11   Q9: Thoughts of better off dead/self-harm past 2 weeks Not at all     Follow Up Actions Taken  Patient declined  referral.     Counseling  Appropriate preventive services were addressed with this patient via screening, questionnaire, or discussion as appropriate for fall prevention, nutrition, physical activity, Tobacco-use cessation, social engagement, weight loss and cognition.  Checklist reviewing preventive services available has been given to the patient.  Reviewed patient's diet, addressing concerns and/or questions.   The patient was instructed to see the dentist every 6 months.   Updated plan of care.  Patient reported difficulty with activities of daily living were addressed today.The patient's PHQ-9 score is consistent with moderate depression. She was provided with information regarding depression.     Follow up in 4-8 weeks for dedicated memory loss visit.     Subjective   Rosalie is a 79 year old, presenting for the following:  Wellness Visit, Memory Loss (Daughter is concerned about memory.), and Generalized Body Aches        9/16/2024    10:25 AM   Additional Questions   Roomed by Digna STANLEY   Accompanied by self in the room         9/16/2024    Information    services provided? Yes   Language Hmong   Type of interpretation provided Face-to-face    name Zenia Byrne    Agency [employer]      [facility] Care Directive  Patient does not have a Health Care Directive or Living Will: Discussed advance care planning with patient; information given to patient to review.    HPI    She is here today with her PCA but they did not accompany her into the exam room for her appointment. When asked to join us, they declined. Rosalie reports that her PCA does most of her cares on a regular basis. She reports that she is eating okay. Sleep has also been okay. She is not taking melatonin. She reports that her memory is not great.    She had a left shoulder injection in August with some relief. She is taking Tylenol only for pain with 2 pills in the morning and 2 pills in the evening. She continues to  have aches and pains all over, especially in her back and neck. She attributes this to her age. No new weakness or numbness/tingling.           9/16/2024   General Health   How would you rate your overall physical health? (!) FAIR   Feel stress (tense, anxious, or unable to sleep) Very much      (!) STRESS CONCERN      9/16/2024   Nutrition   Diet: Regular (no restrictions)            9/16/2024   Exercise   Days per week of moderate/strenous exercise 7 days            9/16/2024   Social Factors   Frequency of gathering with friends or relatives Once a week   Worry food won't last until get money to buy more No   Food not last or not have enough money for food? No   Do you have housing? (Housing is defined as stable permanent housing and does not include staying ouside in a car, in a tent, in an abandoned building, in an overnight shelter, or couch-surfing.) Yes   Are you worried about losing your housing? No   Lack of transportation? No   Unable to get utilities (heat,electricity)? No            9/16/2024   Fall Risk   Fallen 2 or more times in the past year? No   Trouble with walking or balance? No             9/16/2024   Activities of Daily Living- Home Safety   Needs help with the following daily activites Transportation    Shopping    Preparing meals    Housework    Bathing    Laundry    Medication administration    Money management    Toileting    Dressing   Safety concerns in the home None of the above       Multiple values from one day are sorted in reverse-chronological order         9/16/2024   Dental   Dentist two times every year? (!) NO            9/16/2024   Hearing Screening   Hearing concerns? None of the above            9/16/2024   Driving Risk Screening   Patient/family members have concerns about driving No            9/16/2024   General Alertness/Fatigue Screening   Have you been more tired than usual lately? No            9/16/2024   Urinary Incontinence Screening   Bothered by leaking urine in  "past 6 months No            9/16/2024   TB Screening   Were you born outside of the [place]? Yes          Today's PHQ-9 Score:       9/16/2024    10:03 AM   PHQ-9 SCORE   PHQ-9 Total Score MyChart 11 (Moderate depression)   PHQ-9 Total Score 11    11         9/16/2024   Substance Use   Alcohol more than 3/day or more than 7/wk No   Do you have a current opioid prescription? No   How severe/bad is pain from 1 to 10? 9/10   Do you use any other substances recreationally? No        Social History     Tobacco Use    Smoking status: Never     Passive exposure: Never    Smokeless tobacco: Never   Substance Use Topics    Alcohol use: No    Drug use: No                Reviewed and updated as needed this visit by Provider   Tobacco  Allergies  Meds  Problems  Med Hx  Surg Hx  Fam Hx          Current providers sharing in care for this patient include:  Patient Care Team:  Meredith Morrison MD as PCP - General (Family Medicine)  Meredith Morrison MD as Assigned PCP  Kim Son DO as Assigned Musculoskeletal Provider    The following health maintenance items are reviewed in Epic and correct as of today:  Health Maintenance   Topic Date Due    DEXA  Never done    RSV VACCINE (1 - 1-dose 75+ series) Never done    ZOSTER IMMUNIZATION (2 of 2) 09/13/2022    COVID-19 Vaccine (5 - 2024-25 season) 09/01/2024    MEDICARE ANNUAL WELLNESS VISIT  09/16/2025    FALL RISK ASSESSMENT  09/16/2025    ADVANCE CARE PLANNING  09/16/2029    DTAP/TDAP/TD IMMUNIZATION (2 - Td or Tdap) 12/13/2031    PHQ-2 (once per calendar year)  Completed    INFLUENZA VACCINE  Completed    Pneumococcal Vaccine: 65+ Years  Completed    HPV IMMUNIZATION  Aged Out    MENINGITIS IMMUNIZATION  Aged Out    RSV MONOCLONAL ANTIBODY  Aged Out          Objective    Exam  /68 (BP Location: Right arm, Patient Position: Sitting, Cuff Size: Adult Regular)   Pulse 78   Ht 1.46 m (4' 9.48\")   Wt 51.7 kg (114 lb)   SpO2 96%   BMI 24.26 kg/m   " "  Estimated body mass index is 24.26 kg/m  as calculated from the following:    Height as of this encounter: 1.46 m (4' 9.48\").    Weight as of this encounter: 51.7 kg (114 lb).    Physical Exam  GENERAL: alert and no distress  NECK: no adenopathy, no asymmetry, masses, or scars  RESP: lungs clear to auscultation - no rales, rhonchi or wheezes  CV: regular rate and rhythm, normal S1 S2, no S3 or S4, no murmur, click or rub, no peripheral edema  MS: no gross musculoskeletal defects noted  SKIN: no suspicious lesions or rashes  PSYCH: mentation appears normal, affect normal/bright, very chatty        9/16/2024   Mini Cog   Clock Draw Score 0 Abnormal   3 Item Recall 0 objects recalled   Mini Cog Total Score 0        Answers submitted by the patient for this visit:  Patient Health Questionnaire (Submitted on 9/16/2024)  If you checked off any problems, how difficult have these problems made it for you to do your work, take care of things at home, or get along with other people?: Somewhat difficult  PHQ9 TOTAL SCORE: 11    Signed Electronically by: Meredith Morrison MD        "

## 2024-09-16 NOTE — PATIENT INSTRUCTIONS
"- Please get your RSV & shingles (Zoster) vaccines at your pharmacy this fall.     - We will have the updated COVID vaccine in the next week. You can get that here or at your pharmacy.    - Increase your acetaminophen to 2 tablets three times per day (first thing in the morning, midday, and before bedtime) for better pain relief.   - Avoid taking naproxen on a regular basis. This is dangerous for your kidneys and stomach.     - Continue staying active, stretching, and massage for good relief.     - I recommend getting a bone density scan done to see about your bone strength. You may benefit from some medications to help strengthen your bones and reduce your risk of a hip fracture.     - Follow up later this fall to discuss memory concerns further. It would be helpful to come to this visit with a family member who can describe the challenges you are having.     Patient Education   Amaya Sabrina Xeeb Saib Xyuas Kom Tiv Thaiv Tus Kheej  Nov yog ib co amaya sabrina xeeb uas peb yeej meem muab arie tib neeg pab lawv noj qab nyob zoo. Neeta zaum koj pab pawg saib xyuas yuav muaj ib co amaya sabrina xeeb uas tshwj xeeb arie koj nkaus xwb. Thov nrog koj pab pawg saib xyuas sib evelio txog neeta yam uas koj toob lakeshia kom tiv thaiv koj tus kheej.  Madhu Coj Lub Neej  Es xaws xais ntau mary 150 feeb txhua lub pittman tiam (30 feeb txhua hnub, 5 hnub ib lub pittman tiam).  Ua yam pab cov leeg muaj zog tuaj 2 zaug txhua lub pittman tiam. Neeta yam no pab koj tswj hwm koj lub cev qhov hnyav thiab tiv thaiv ntawm kab mob.  Txhob haus luam yeeb.  Pleev tshuaj tiv thaiv tshav kub kom thiaj tsis raug mob khees xaws ntawm nqaij tawv.  Txhua 2 mus arie 5 xyoos yuav tau kuaj koj lub tsev arie qhov radon. Radon yog ib joel rick uas tsis muaj xim tsis muaj ntxhiab uas mob tau koj cov ntsws. Kom dayana kawmelodie ntxiv, mus saib [email or web address] thiab ntaus ntawv \"Radon in Homes.\"  Ceev cov phom kom tsis muaj mos txwv arie hauv thiab muab xauv jhonny hauv ib qho chaw nyab xeeb xws li lub " "txhoj, los yog muab xauv jhonny thiab muab cov yawm sij zais jhonny. Muab cov mos txwv xauv arie hauv lwm qhov chaw nrug cov phom. Kom thiaj kawm ntxiv, mus saib [email or web address] thiab ntaus ntawv \"safe gun storage.\"  Matty Noj Qab Huv  Noj 5 qho txiv hmab txiv ntoo thiab zaub txhua hnub.  Noj nplem nplej, txhuv xim av thiab cov fawm muaj nplej (los theej nplem dawb, txhuv dawb, thiab fawm dawb).  Ua tib zoo noj calcium thiab vitamin D ntau. Saib cov ntawv lo arie pob khoom noj thiab siv zog noj kom txog 100% RDA (qhov ntau hauv ib hnub).  Yemargaretj meem kuaj ntsuas  Txhua 6 lub hli mus kuaj hniav thiab muab tu.  Txhua xyoo mus saib koj pab pawg saib xyuas matty noj qab nyob zoo kom sib evelio txog:  Ib yam dab tsi uas hloov ntawm koj txoj matty noj qab nyob zoo.  Cov tshuaj uas koj pab pawg tau hais kom siv.  Matty saib xyuas kom tiv thaiv, matty npaj arie tsev neeg, thiab yuav ua li jolene tiv thaiv ntawm kab mob uas ntev.  Matty txhaj tshuaj (koob tshuaj tiv thaiv)   Cov koob tshuaj HPV (txog thaum muaj 24 xyoo), yog koj yeej tsis tau txais mary li.  Cov koob tshuaj Hepatitis B Kab Mob Siab (txog thaum muaj 57 xyoos), yog koj yeej tsis tau txais mary li.  Koob tshuaj COVID-19: Txais koob tshuaj no thaum txog caij txais.  Koob tshuaj tiv thaiv ntawm mob khaub thuas: Txais txhua xyoo.  Koob tshuaj tiv thaiv mob daig tsaig: Txais txhua 10 xyoo.  Pneumococcal, hepatitis A, thiab RSV cov koob tshuaj: Nug koj pab pawg saib xyuas arden puas tsim nyog arie koj txais cov no raws li koj qhov pheej hmoo.  Koob tshuaj tiv thaiv ntawm kab mob sawv hlwv (arie cov muaj 48 xyoo rov andrey).  Matty kuaj ntsuas arie matty noj qab nyob zoo  Kuaj mob ntshav qab zib:  Pib thaum muaj 33 xyoos, Mus kuaj mob ntshav qab zib txhua 3 xyoos los yog ntau zog.  Yog koj tseem tsis tau txog 33 xyoos, nug koj pab pawg saib xyuas arden puas tsim nyog arie koj kuaj mob ntshav qab zib.  Kuaj cholesterol: Thaum muaj 37 xyoo, pib kuaj cholesterol txhua 5 xyoos, los yog ntau mary ntawd yog kws rory mob " qhia.  Kuaj txha qhov tuab (DEXA): Thaum txog 48 xyoo lawd, nug koj pab pawg saib xyuas arden puas tsim nyog arie koj kuaj txha arden puas ruaj.  Kab Mob Siab Hepatitis C: Kuaj ib zaug hauv koj lub neej.  Kuaj Txoj Hlab Ntshav Hauv Plab: Nrog koj tus kws rory mob evelio txog matty kuaj ntsuas no yog koj:  Tau haus luam yeeb ib zaug li; thiab  Yog txiv neej; thiab  Nruab hnub nyoog 63 thiab 73.  Mob Lakeshia Cees (kis mob dhau ntawm matty sib deev)  Ua ntej muaj 22 xyoos: Nug koj pab pawg saib xyuas arden puas tsim nyog kuaj arie mob lakeshia cees.  Lukas qab muaj 22 xyoos: Mus kuaj arie mob lakeshia cees yog koj muaj matty pheej hmoo. Koj muaj matty pheej hmoo arie mob lakeshia cees (suav nrog HIV) yog:  Koj sib deev nrog ntau mary ib tug neeg.  Koj tsis siv cov hnab looj qau thaum sib deev.  Koj los yog koj tus khub deev kuaj pom tias muaj mob lakeshia cees lawm.  Yog koj muaj matty pheej hmoo arie mob lakeshia cees HIV, nug txog cov tshuaj PrEP kom tiv thaiv ntawm HIV.  Mus kuaj arie mob lakeshia cees HIV yam ntau amry ib zaug hauv koj lub neej, txawm yog koj muaj matty pheej hmoo arie mob lakeshia cees HIV los tsis muaj los xij.  Kuaj arie mob khees xaws  Kuaj lub ncauj tsev me nyuam arie mob khees xaws: Yog koj muaj ib lub ncauj tsev me nyuam, radha yuav tau ib sij kuaj lub ncauj tsev me nyuam seb puas muaj mob khees xaws pib thaum muaj 19 xyoos. Neeg feem coob uas ib sij kuaj lub ncauj tsev me nyuam thiab tsis pom dab tsi txawv lawv tsum tau lukas qab muaj 63 xyoos. Nrog koj tus kws rory mob sib evelio txog qhov no.  Kuaj lub mis arie mob khees xaws (mammogram): Yog koj tau muaj mis mary li, yuav tau ib sij kuaj lub mis pib thaum muaj 40 xyoo. Matty kuaj no yog kom saib seb puas muaj mob khees xaws hauv lub mis.  Kuaj Txoj Hnyuv Arie Mob Khees Xaws: Yeej tseem ceeb pib kuaj txoj hnyuv arie mob khees xaws pib thaum muaj 43 xyoos.  Txhua 8 xyoo yuav tau kuaj txoj hnyuv (los yog ntau zog yog koj muaj matty pheej hmoo) Los sis, nug koj tus kws rory mob txog matty kuaj thooj quav FIT txhua xyoo los yog  Cologuard txhua 1 xyoos.  Kom thiaj kawm ntxiv txog neeta joel kuaj no, mus saib: [email or web address].  Yog xav tau matty pab txog qhov no, mus saib: [email or web address].  Kuaj lub pierre kua phev (prostate) arie mob khees xaws: Yog koj muaj ib lub pierre kua phev (prostate) thiab nruab hnub nyoog 53 mus arie 67, nug koj tus kws rory mob arden puas tsim nyog arie koj kuaj lub pierre kua phev.  Kuaj ntsws arie mob khees xaws: Yog koj haus luam yeeb gregory sim no los yog tau haus yav tas los uas muaj hnub nyoog nruab 50 xyoos mus arie 80 xyoo, nug koj pab pawg saib xyuas arden puas tsim nyog arie koj yeej meem kuaj lub ntsws arie mob khees xaws.    Arie cov joel phiaj matty siv ua ntaub ntawv qhia nkaus xwb. Yuav tsis pauv matty qhia los ntawm koj qhov chaw rory mob. Copyright (tracy scott)   2023 [facility].   Txa txoj sonia raug tswj tseg lawm. Tshaj xyuas los ntawm [facility]. [employer] [id number] - REV 04/24.  Learning About Activities of Daily Living  What are activities of daily living?     Activities of daily living (ADLs) are the basic self-care tasks you do every day. These include eating, bathing, dressing, and moving around.  As you age, and if you have health problems, you may find that it's harder to do some of these tasks. If so, your doctor can suggest ideas that may help.  To measure what kind of help you may need, your doctor will ask how well you are able to do ADLs. Let your doctor know if there are any tasks that you are having trouble doing. This is an important first step to getting help. And when you have the help you need, you can stay as independent as possible.  How will a doctor assess your ADLs?  Asking about ADLs is part of a routine health checkup your doctor will likely do as you age. Your health check might be done in a doctor's office, in your home, or at a hospital. The goal is to find out if you are having any problems that could make it hard to care for yourself or that make  it unsafe for you to be on your own.  To measure your ADLs, your doctor will ask how hard it is for you to do routine tasks. Your doctor may also want to know if you have changed the way you do a task because of a health problem. Your doctor may watch how you:  Walk back and forth.  Keep your balance while you stand or walk.  Move from sitting to standing or from a bed to a chair.  Button or unbutton a shirt or sweater.  Remove and put on your shoes.  It's common to feel a little worried or anxious if you find you can't do all the things you used to be able to do. Talking with your doctor about ADLs is a way to make sure you're as safe as possible and able to care for yourself as well as you can. You may want to bring a caregiver, friend, or family member to your checkup. They can help you talk to your doctor.  Follow-up care is a key part of your treatment and safety. Be sure to make and go to all appointments, and call your doctor if you are having problems. It's also a good idea to know your test results and keep a list of the medicines you take.  Current as of: October 24, 2023  Content Version: 14.1    0912-0361 [employer]turn.   Care instructions adapted under license by your healthcare professional. If you have questions about a medical condition or this instruction, always ask your healthcare professional. [employer] disclaims any warranty or liability for your use of this information.    Learning About Stress  What is stress?     Stress is your body's response to a hard situation. Your body can have a physical, emotional, or mental response. Stress is a fact of life for most people, and it affects everyone differently. What causes stress for you may not be stressful for someone else.  A lot of things can cause stress. You may feel stress when you go on a job interview, take a test, or run a race. This kind of short-term stress is normal and even useful. It can help you if you need to  work hard or react quickly. For example, stress can help you finish an important job on time.  Long-term stress is caused by ongoing stressful situations or events. Examples of long-term stress include long-term health problems, ongoing problems at work, or conflicts in your family. Long-term stress can harm your health.  How does stress affect your health?  When you are stressed, your body responds as though you are in danger. It makes hormones that speed up your heart, make you breathe faster, and give you a burst of energy. This is called the fight-or-flight stress response. If the stress is over quickly, your body goes back to normal and no harm is done.  But if stress happens too often or lasts too long, it can have bad effects. Long-term stress can make you more likely to get sick, and it can make symptoms of some diseases worse. If you tense up when you are stressed, you may develop neck, shoulder, or low back pain. Stress is linked to high blood pressure and heart disease.  Stress also harms your emotional health. It can make you perez, tense, or depressed. Your relationships may suffer, and you may not do well at work or school.  What can you do to manage stress?  You can try these things to help manage stress:   Do something active. Exercise or activity can help reduce stress. Walking is a great way to get started. Even everyday activities such as housecleaning or yard work can help.  Try yoga or priya chi. These techniques combine exercise and meditation. You may need some training at first to learn them.  Do something you enjoy. For example, listen to music or go to a movie. Practice your hobby or do volunteer work.  Meditate. This can help you relax, because you are not worrying about what happened before or what may happen in the future.  Do guided imagery. Imagine yourself in any setting that helps you feel calm. You can use online videos, books, or a teacher to guide you.  Do breathing exercises. For  "example:  From a standing position, bend forward from the waist with your knees slightly bent. Let your arms dangle close to the floor.  Breathe in slowly and deeply as you return to a standing position. Roll up slowly and lift your head last.  Hold your breath for just a few seconds in the standing position.  Breathe out slowly and bend forward from the waist.  Let your feelings out. Talk, laugh, cry, and express anger when you need to. Talking with supportive friends or family, a counselor, or a carol leader about your feelings is a healthy way to relieve stress. Avoid discussing your feelings with people who make you feel worse.  Write. It may help to write about things that are bothering you. This helps you find out how much stress you feel and what is causing it. When you know this, you can find better ways to cope.  What can you do to prevent stress?  You might try some of these things to help prevent stress:  Manage your time. This helps you find time to do the things you want and need to do.  Get enough sleep. Your body recovers from the stresses of the day while you are sleeping.  Get support. Your family, friends, and community can make a difference in how you experience stress.  Limit your news feed. Avoid or limit time on social media or news that may make you feel stressed.  Do something active. Exercise or activity can help reduce stress. Walking is a great way to get started.  Where can you learn more?  Go to https://www.Abakan.net/patiented  Enter N032 in the search box to learn more about \"Learning About Stress.\"  Current as of: October 24, 2023               Content Version: 14.0    4940-4104 [employer].   Care instructions adapted under license by your healthcare professional. If you have questions about a medical condition or this instruction, always ask your healthcare professional. [employer] disclaims any warranty or liability for your use of this " information.      Learning About Depression Screening  What is depression screening?  Depression screening is a way to see if you have depression symptoms. It may be done by a doctor or counselor. It's often part of a routine checkup. That's because your mental health is just as important as your physical health.  Depression is a mental health condition that affects how you feel, think, and act. You may:  Have less energy.  Lose interest in your daily activities.  Feel sad and grouchy for a long time.  Depression is very common. It affects people of all ages.  Many things can lead to depression. Some people become depressed after they have a stroke or find out they have a major illness like cancer or heart disease. The death of a loved one or a breakup may lead to depression. It can run in families. Most experts believe that a combination of inherited genes and stressful life events can cause it.  What happens during screening?  You may be asked to fill out a form about your depression symptoms. You and the doctor will discuss your answers. The doctor may ask you more questions to learn more about how you think, act, and feel.  What happens after screening?  If you have symptoms of depression, your doctor will talk to you about your options.  Doctors usually treat depression with medicines or counseling. Often, combining the two works best. Many people don't get help because they think that they'll get over the depression on their own. But people with depression may not get better unless they get treatment.  The cause of depression is not well understood. There may be many factors involved. But if you have depression, it's not your fault.  A serious symptom of depression is thinking about death or suicide. If you or someone you care about talks about this or about feeling hopeless, get help right away.  It's important to know that depression can be treated. Medicine, counseling, and self-care may help.  Where can  "you learn more?  Go to https://www.Profound.net/patiented  Enter T185 in the search box to learn more about \"Learning About Depression Screening.\"  Current as of: June 24, 2023  Content Version: 14.1 2006-2024 [employer], [employer].   Care instructions adapted under license by your healthcare professional. If you have questions about a medical condition or this instruction, always ask your healthcare professional. Healthwise, [employer] disclaims any warranty or liability for your use of this information.       "

## 2024-11-07 ENCOUNTER — DOCUMENTATION ONLY (OUTPATIENT)
Dept: FAMILY MEDICINE | Facility: CLINIC | Age: 79
End: 2024-11-07
Payer: COMMERCIAL

## 2024-11-11 ENCOUNTER — OFFICE VISIT (OUTPATIENT)
Dept: ORTHOPEDICS | Facility: CLINIC | Age: 79
End: 2024-11-11
Payer: COMMERCIAL

## 2024-11-11 DIAGNOSIS — M19.012 PRIMARY OSTEOARTHRITIS OF LEFT SHOULDER: Primary | ICD-10-CM

## 2024-11-11 DIAGNOSIS — M25.512 LEFT SHOULDER PAIN, UNSPECIFIED CHRONICITY: ICD-10-CM

## 2024-11-11 PROCEDURE — 20611 DRAIN/INJ JOINT/BURSA W/US: CPT | Mod: LT | Performed by: STUDENT IN AN ORGANIZED HEALTH CARE EDUCATION/TRAINING PROGRAM

## 2024-11-11 PROCEDURE — 99213 OFFICE O/P EST LOW 20 MIN: CPT | Mod: 25 | Performed by: STUDENT IN AN ORGANIZED HEALTH CARE EDUCATION/TRAINING PROGRAM

## 2024-11-11 RX ORDER — LIDOCAINE HYDROCHLORIDE 10 MG/ML
3 INJECTION, SOLUTION INFILTRATION; PERINEURAL
Status: COMPLETED | OUTPATIENT
Start: 2024-11-11 | End: 2024-11-11

## 2024-11-11 RX ORDER — TRIAMCINOLONE ACETONIDE 40 MG/ML
40 INJECTION, SUSPENSION INTRA-ARTICULAR; INTRAMUSCULAR
Status: COMPLETED | OUTPATIENT
Start: 2024-11-11 | End: 2024-11-11

## 2024-11-11 RX ORDER — ROPIVACAINE HYDROCHLORIDE 5 MG/ML
4 INJECTION, SOLUTION EPIDURAL; INFILTRATION; PERINEURAL
Status: COMPLETED | OUTPATIENT
Start: 2024-11-11 | End: 2024-11-11

## 2024-11-11 RX ADMIN — LIDOCAINE HYDROCHLORIDE 3 ML: 10 INJECTION, SOLUTION INFILTRATION; PERINEURAL at 11:46

## 2024-11-11 RX ADMIN — ROPIVACAINE HYDROCHLORIDE 4 ML: 5 INJECTION, SOLUTION EPIDURAL; INFILTRATION; PERINEURAL at 11:46

## 2024-11-11 RX ADMIN — TRIAMCINOLONE ACETONIDE 40 MG: 40 INJECTION, SUSPENSION INTRA-ARTICULAR; INTRAMUSCULAR at 11:46

## 2024-11-11 NOTE — PATIENT INSTRUCTIONS
1. Primary osteoarthritis of left shoulder    2. Left shoulder pain, unspecified chronicity        Plan:  -Left glenohumeral performed in clinic today  - Will refer for suprascapular nerve block/potential radiofrequency ablation given chronic shoulder pain     If you had imaging performed/ordered at your appointment today, you can expect to see your radiology results in MyChart within approximately 48 business hours.     If you have questions/concerns after your appointment, please send my team a Pain[employer] Twist message or call the clinic at [phone].     Dr. Ebony Weiss DO, [facility]  Sports Medicine and Orthopedics    Dr. Weiss's Clinic Locations and Contact Numbers:   [place] APPOINTMENTS: [phone]      [address] [place] [address] RADIOLOGY: [phone]   [place] [address] PHYSICAL THERAPY: [phone]    HAND THERAPY/OT: [phone]   [place] BILLING QUESTIONS: [phone] [address] FAX: [phone]   [place] [id number]       1. Primary osteoarthritis of left shoulder    2. Left shoulder pain, unspecified chronicity        Plan:    If you had imaging performed/ordered at your appointment today, you can expect to see your radiology results in MyChart within approximately 48 business hours.     If you have questions/concerns after your appointment, please send my team a [employer]ting With [employer] Twist message or call the clinic at [phone].     Dr. Ebony Weiss DO, [facility]  Sports Medicine and Orthopedics    Dr. Weiss's Clinic Locations and Contact Numbers:   [place] APPOINTMENTS: [phone]      [address] [place] [address] RADIOLOGY: [phone]   [place] [address] PHYSICAL THERAPY: [phone]    HAND THERAPY/OT: [phone]   [place] BILLING QUESTIONS: [phone] [address] FAX: [phone]   [place] [address]

## 2024-11-11 NOTE — LETTER
11/11/2024      Rosalie Carreno  [address] [place] [address]      Dear Colleague,    Thank you for referring your patient, Rosalie Carreno, to the [facility]ICINE [facility]. Please see a copy of my visit note below.    Sports Medicine Procedure Note    Rosalie was seen today for pain.    Diagnoses and all orders for this visit:    Primary osteoarthritis of left shoulder  -     Pain Management  Referral; Future    Left shoulder pain, unspecified chronicity    Other orders  -     Large Joint Injection/Arthocentesis: L glenohumeral joint        Rosalie Carreno is a/an 79 year old female who is seen for Corticosteroid injection of Left glenohumeral joint.   Last injection: 8/8 with Dr. Son, provided 2? months relief of at least 50%.     -Left glenohumeral performed in clinic today  - Will refer for suprascapular nerve block/potential radiofrequency ablation given chronic shoulder pain   -Post-injection instructions were provided to the patient  -Follow-up: PRN. Can return no sooner then every 3 months for repeat injection.     Large Joint Injection/Arthocentesis: L glenohumeral joint    Date/Time: 11/11/2024 11:46 AM    Performed by: Ebony Weiss DO  Authorized by: Ebony Weiss DO    Indications:  Osteoarthritis and pain  Needle Size:  22 G  Guidance: ultrasound    Approach:  Posterior  Location:  Shoulder      Site:  L glenohumeral joint  Medications:  40 mg triamcinolone 40 MG/ML; 4 mL ROPivacaine 5 MG/ML; 3 mL lidocaine 1 %  Outcome:  Tolerated well, no immediate complications  Procedure discussed: discussed risks, benefits, and alternatives    Consent Given by:  Patient  Timeout: timeout called immediately prior to procedure    Prep: patient was prepped and draped in usual sterile fashion     Ultrasound was used to ensure safe and accurate needle placement and injection. Ultrasound images of the procedure were permanently stored.  1ml of 8.4% Sodium  Bicarbonate solution was used to buffer the local numbing agent for today's injection          Post-Injection Discharge Instructions    You may shower, however avoid swimming, tub baths or hot tubs for 24 hours following your procedure  You may have a mild to moderate increase in pain for a few days following the injection.  The lidocaine (local numbing medicine) will wear off in several hours. It usually takes 3-5 days for the steroid medication to start working although it may take up to 14 days for full effect.   You may use ice packs for 10-15 minutes, 3 to 4 times a day at the injection site for comfort if needed  You may use extra strength Tylenol for pain control if necessary   If you were fasting, you may resume your normal diet and medications after the procedure  If you have diabetes, your blood sugar may be higher than normal for 10-14 days following a steroid injection. Contact your doctor who manages your diabetes if your blood sugar is significantly higher than usual    If you experience any of the following, call Sports Medicine @  [phone]  -Fever over 100 degrees F  -Swelling, bleeding, redness, drainage, warmth at the injection site  -New or significant worsening pain        Dr. Ebony Weiss, DO  [facility]  Sports Medicine     -----        Again, thank you for allowing me to participate in the care of your patient.        Sincerely,        Ebony Weiss, DO

## 2024-11-11 NOTE — PROGRESS NOTES
Sports Medicine Procedure Note    Rosalie was seen today for pain.    Diagnoses and all orders for this visit:    Primary osteoarthritis of left shoulder  -     Pain Management  Referral; Future    Left shoulder pain, unspecified chronicity    Other orders  -     Large Joint Injection/Arthocentesis: L glenohumeral joint        Rosalie Carreno is a/an 79 year old female who is seen for Corticosteroid injection of Left glenohumeral joint.   Last injection: 8/8 with Dr. Son, provided 2? months relief of at least 50%.     -Left glenohumeral performed in clinic today  - Will refer for suprascapular nerve block/potential radiofrequency ablation given chronic shoulder pain   -Post-injection instructions were provided to the patient  -Follow-up: PRN. Can return no sooner then every 3 months for repeat injection.     Large Joint Injection/Arthocentesis: L glenohumeral joint    Date/Time: 11/11/2024 11:46 AM    Performed by: Ebony Weiss DO  Authorized by: Ebony Weiss DO    Indications:  Osteoarthritis and pain  Needle Size:  22 G  Guidance: ultrasound    Approach:  Posterior  Location:  Shoulder      Site:  L glenohumeral joint  Medications:  40 mg triamcinolone 40 MG/ML; 4 mL ROPivacaine 5 MG/ML; 3 mL lidocaine 1 %  Outcome:  Tolerated well, no immediate complications  Procedure discussed: discussed risks, benefits, and alternatives    Consent Given by:  Patient  Timeout: timeout called immediately prior to procedure    Prep: patient was prepped and draped in usual sterile fashion     Ultrasound was used to ensure safe and accurate needle placement and injection. Ultrasound images of the procedure were permanently stored.  1ml of 8.4% Sodium Bicarbonate solution was used to buffer the local numbing agent for today's injection          Post-Injection Discharge Instructions    You may shower, however avoid swimming, tub baths or hot tubs for 24 hours following your procedure  You may have a mild to  moderate increase in pain for a few days following the injection.  The lidocaine (local numbing medicine) will wear off in several hours. It usually takes 3-5 days for the steroid medication to start working although it may take up to 14 days for full effect.   You may use ice packs for 10-15 minutes, 3 to 4 times a day at the injection site for comfort if needed  You may use extra strength Tylenol for pain control if necessary   If you were fasting, you may resume your normal diet and medications after the procedure  If you have diabetes, your blood sugar may be higher than normal for 10-14 days following a steroid injection. Contact your doctor who manages your diabetes if your blood sugar is significantly higher than usual    If you experience any of the following, call Sports Medicine @  [phone]  -Fever over 100 degrees F  -Swelling, bleeding, redness, drainage, warmth at the injection site  -New or significant worsening pain        Dr. Ebony Weiss, DO  [facility]  Sports Medicine     -----

## 2024-11-27 NOTE — PROGRESS NOTES
Annual home visit completed today.   Completed [facility] assessment, OBRA and Support Plan. Continues to have chronic pains to her left shoulder, back an neck.  She reports that she recently got injections, and sees a chiropractor and massage therapy. Encouraged to get her flu and covid vaccines soon. Current services include PCA and homemaking.  She also uses a cane and has shower chair. Safe disposal of meds discussed and copy of sites given to client.  CC reviewed and received expressed/verbal approval by member of the care plan, including choice of services and providers, choices related to sharing the plan or portions of the plan with others, appeals rights, and data privacy information.   Revisit in six months.

## 2025-02-24 DIAGNOSIS — E55.9 VITAMIN D DEFICIENCY: ICD-10-CM

## 2025-02-24 DIAGNOSIS — F03.B4 MODERATE DEMENTIA WITH ANXIETY, UNSPECIFIED DEMENTIA TYPE (H): Primary | ICD-10-CM

## 2025-02-24 RX ORDER — CHOLECALCIFEROL (VITAMIN D3) 50 MCG
1 TABLET ORAL DAILY
Qty: 90 TABLET | Refills: 1 | Status: SHIPPED | OUTPATIENT
Start: 2025-02-24

## 2025-02-25 ENCOUNTER — DOCUMENTATION ONLY (OUTPATIENT)
Dept: FAMILY MEDICINE | Facility: CLINIC | Age: 80
End: 2025-02-25
Payer: COMMERCIAL

## 2025-02-25 NOTE — PROGRESS NOTES
Call pt about lab result. Pt daughter ask if we can email her the after visit summary from 2/21/2025. Email after visit summary to [email or web address] and mail a copy to pt address.

## 2025-03-04 ENCOUNTER — HOSPITAL ENCOUNTER (OUTPATIENT)
Dept: BONE DENSITY | Facility: HOSPITAL | Age: 80
Discharge: HOME | End: 2025-03-04
Attending: FAMILY MEDICINE
Payer: COMMERCIAL

## 2025-03-04 ENCOUNTER — HOSPITAL ENCOUNTER (OUTPATIENT)
Dept: MRI IMAGING | Facility: HOSPITAL | Age: 80
Discharge: HOME | End: 2025-03-04
Attending: FAMILY MEDICINE
Payer: COMMERCIAL

## 2025-03-04 DIAGNOSIS — F03.B4 MODERATE DEMENTIA WITH ANXIETY, UNSPECIFIED DEMENTIA TYPE (H): ICD-10-CM

## 2025-03-04 DIAGNOSIS — Z78.0 POST-MENOPAUSAL: ICD-10-CM

## 2025-03-04 PROCEDURE — T1013 SIGN LANG/ORAL INTERPRETER: HCPCS

## 2025-03-04 PROCEDURE — 70551 MRI BRAIN STEM W/O DYE: CPT

## 2025-03-04 PROCEDURE — 77080 DXA BONE DENSITY AXIAL: CPT

## 2025-03-11 ENCOUNTER — OFFICE VISIT (OUTPATIENT)
Dept: FAMILY MEDICINE | Facility: CLINIC | Age: 80
End: 2025-03-11
Payer: COMMERCIAL

## 2025-03-11 VITALS
RESPIRATION RATE: 16 BRPM | DIASTOLIC BLOOD PRESSURE: 61 MMHG | TEMPERATURE: 97.9 F | WEIGHT: 113 LBS | OXYGEN SATURATION: 96 % | BODY MASS INDEX: 24.45 KG/M2 | SYSTOLIC BLOOD PRESSURE: 134 MMHG | HEART RATE: 61 BPM

## 2025-03-11 DIAGNOSIS — F01.50 MIXED ALZHEIMER AND VASCULAR DEMENTIA (H): Primary | ICD-10-CM

## 2025-03-11 DIAGNOSIS — F02.80 MIXED ALZHEIMER AND VASCULAR DEMENTIA (H): Primary | ICD-10-CM

## 2025-03-11 DIAGNOSIS — F41.1 GAD (GENERALIZED ANXIETY DISORDER): ICD-10-CM

## 2025-03-11 DIAGNOSIS — M25.511 CHRONIC PAIN OF BOTH SHOULDERS: ICD-10-CM

## 2025-03-11 DIAGNOSIS — M85.89 OSTEOPENIA OF MULTIPLE SITES: ICD-10-CM

## 2025-03-11 DIAGNOSIS — G30.9 MIXED ALZHEIMER AND VASCULAR DEMENTIA (H): Primary | ICD-10-CM

## 2025-03-11 DIAGNOSIS — G89.29 CHRONIC PAIN OF BOTH SHOULDERS: ICD-10-CM

## 2025-03-11 DIAGNOSIS — M25.512 CHRONIC PAIN OF BOTH SHOULDERS: ICD-10-CM

## 2025-03-11 LAB
ANION GAP SERPL CALCULATED.3IONS-SCNC: 8 MMOL/L (ref 3–14)
BUN SERPL-MCNC: 11 MG/DL (ref 7–30)
CALCIUM SERPL-MCNC: 9.1 MG/DL (ref 8.5–10.1)
CHLORIDE BLD-SCNC: 107 MMOL/L (ref 94–109)
CO2 SERPL-SCNC: 27 MMOL/L (ref 20–32)
CREAT SERPL-MCNC: 0.6 MG/DL (ref 0.52–1.04)
CYSTATIN C (ROCHE): 1 MG/L (ref 0.6–1)
EGFRCR SERPLBLD CKD-EPI 2021: 90 ML/MIN/1.73M2
GFR/BSA.PRED SERPLBLD CYS-BASED-ARV: 67 ML/MIN/1.73M2
GLUCOSE BLD-MCNC: 133 MG/DL (ref 70–99)
POTASSIUM BLD-SCNC: 3.8 MMOL/L (ref 3.4–5.3)
SODIUM SERPL-SCNC: 142 MMOL/L (ref 135–145)

## 2025-03-11 RX ORDER — SENNOSIDES 8.6 MG
650 CAPSULE ORAL 3 TIMES DAILY
Qty: 100 TABLET | Refills: 1 | Status: SHIPPED | OUTPATIENT
Start: 2025-03-11

## 2025-03-11 RX ORDER — SERTRALINE HYDROCHLORIDE 25 MG/1
25 TABLET, FILM COATED ORAL DAILY
Qty: 60 TABLET | Refills: 1 | Status: SHIPPED | OUTPATIENT
Start: 2025-03-11

## 2025-03-11 NOTE — LETTER
March 13, 2025      Rosalie Carreno  [address] [place] [address]        Dear ,    We are writing to inform you of your test results.    Your test results were normal.     Resulted Orders   Cystatin C with GFR   Result Value Ref Range    Cystatin C 1.0 0.6 - 1.0 mg/L    GFR Calculated with Cystatin C 67 >=60 mL/min/1.73m2    Narrative    eGFRcys in adults is calculated using the 2012 CKD-EPI cystatin c equation which includes age and gender (Nicol et al., NE, DOI: 10.1056/XVILqv3090842)   Basic metabolic panel   Result Value Ref Range    Sodium 142 135 - 145 mmol/L    Potassium 3.8 3.4 - 5.3 mmol/L    Chloride 107 94 - 109 mmol/L    Carbon Dioxide (CO2) 27 20 - 32 mmol/L    Anion Gap 8 3 - 14 mmol/L    Urea Nitrogen 11 7 - 30 mg/dL    Creatinine 0.60 0.52 - 1.04 mg/dL    GFR Estimate 90 >60 mL/min/1.73m2    Calcium 9.1 8.5 - 10.1 mg/dL    Glucose 133 (H) 70 - 99 mg/dL       If you have any questions or concerns, please call the clinic at the number listed above.       Sincerely,      Benjamin Rosenstein, MD    Electronically signed

## 2025-03-11 NOTE — PROGRESS NOTES
" [facility] [address] [place] [address]  Phone: [phone]  Fax: [phone]    Patient:  Rosalie Carreno, Date of birth 1945  Date of Visit:  03/11/2025  Referring Provider No ref. provider found  Reason for visit: Follow-up      Rosalie Carreno is a 79 year old female with pmhx of probably AlzD seen today for follow-up    (G30.9,  F01.50,  F02.80) Mixed Alzheimer and vascular dementia (H)  (primary encounter diagnosis)  Comment: Imaging findings seem most consistent with likely mixed etiology of dementia including Alzheimer's and vascular dementia as.  Did not recommend cholinesterase inhibitors given her near bradycardia as well as likely limited benefit.  Additionally likely limited indication or benefit of memantine.  Recommended ongoing supportive therapies.  Additionally will start sertraline for anxiety which may assist with overall cognitive function.  Recommend follow-up with myself or Dr. Morrison in 4 weeks for monitoring  Plan: sertraline (ZOLOFT) 25 MG tablet          (F41.1) AVERY (generalized anxiety disorder)  Comment: She reports ongoing symptoms of anxiety, PHQ-9 also consistent with depression.  Likely contributing to decreased cognitive function, though may also represent behavior of dementia.  Recommend starting sertraline 25 mg daily, if tolerated increase to 50 mg daily after 2 weeks.  Repeat BMP today for sodium monitoring  Plan:   -sertraline (ZOLOFT) 25 MG tablet  - Cystatin C with GFR, Basic metabolic panel    (M85.89) Osteopenia of multiple sites  Comment: Recent DEXA scan with T-score -2.1.  Consistent with osteopenia.  Strongly consider bone directed treatment with hip fracture risk greater than 3% (of note, on imaging report, hip fracture risk estimated at 3.2% however this is using the \"\" version of FRAX that may underestimate fracture risk; 5.2% by use of \" \" calculator as below).  However prior to starting this, would " recommend repletion of vitamin D to normal range.  Additionally will obtain Cystatin C as suspect her renal function is overestimated given her overall decreased mass - denosumab may be most appropriate.   Plan:  -Cystatin C with GFR, Basic metabolic panel        (M25.511,  G89.29,  M25.512) Chronic pain of both shoulders  Comment: Notes a history of frozen shoulder and ongoing pain of her shoulders as well as her back and knees.  Likely represents multifocal osteoarthritis.  Recommend scheduled Tylenol 3 times daily plus once as needed.  Completed assisted parking form today as well  Plan: acetaminophen (TYLENOL) 650 MG CR tablet          Benjamin Rosenstein, MD, MA  [facility]     This note was completed with the assistance of dictation software. Typos and word substitution-errors are expected and unintended.      I spent a total of 49 minutes on the day of the visit.   Time spent by me today doing chart review, history and exam, documentation and further activities per the note    The longitudinal plan of care for the diagnosis(es)/condition(s) as documented were addressed during this visit. Due to the added complexity in care, I will continue to support Rosalie in the subsequent management and with ongoing continuity of care.      Subjective   Rosalie is a 79 year old female who presents for RECHECK (Follow up)    Presents today for follow-up from prior evaluation.  Briefly, progressive cognitive changes over the last 2 to 3 years, more significantly in the past 8 to 9 months, with RUDAS of 10/30 seeming consistent with Alzheimer's dementia or LATE.     Lab evaluation without concerning findings.  Was found to have significant low vitamin D level and recommended supplementation.  Vitamin B12 also borderline low, and recommended supplementation for cognitive support.    Today reviewed recent findings from MRI.  Notably significant white matter hyperintensities with near plaque formation  particularly most superiorly.  Consistent with likely significant micro ischemic vascular changes.  Also with notably decreased hippocampal volumes as well as decreased insular cortex volume, modestly out of proportion, consistent with an Alzheimer's type dementia.    Discussed the findings with him today, likely represents a mixed etiology.  We did review possible medication treatments.  Given that she is approximately moderate stage, we discussed cholinesterase inhibitors, though likely of limited benefit and she already has near bradycardia.  Memantine likely of limited utility.  Would need further evaluation to even consider antiamyloid medications.    However she does continue to have decreased mood.  She describes significant anxieties related to her children and grandchildren.  Suspect this is contributing to cognitive changes and recommended treatments related to this.    Additionally reviewed recent findings of DEXA scan.  Discussed bone directed treatment though recommend repletion of vitamin D level.    Pertinent history obtain from: patient and patient's child(ivan)    Objective     Vital signs:  /61   Pulse 61   Temp 97.9  F (36.6  C) (Oral)   Resp 16   Wt 51.3 kg (113 lb)   SpO2 96%   BMI 24.45 kg/m    Blood pressure 134/61, pulse 61, temperature 97.9  F (36.6  C), temperature source Oral, resp. rate 16, weight 51.3 kg (113 lb), SpO2 96%.  Physical Exam      General: Awake, seated comfortably, appears well and NAD   CV: RRR, normal S1/S2, no murmurs/rubs/gallops  Pulm: Normal WOB, lungs CTAB, no wheezes or crackles, good air movement   Neuro: Alert, frequent defers to her daughter, severely [place]    Results      EXAM: MR BRAIN W/O CONTRAST  LOCATION: [facility]  DATE: 3/4/2025     INDICATION: Dementia, suspect Alzheimers, eval for contributory factors other etiologies  COMPARISON: None.  TECHNIQUE: Multiplanar multisequence head MRI without intravenous contrast  according to the dementia protocol.     FINDINGS:  INTRACRANIAL CONTENTS: No acute or subacute infarct. No mass, acute hemorrhage, or extra-axial fluid collections. Patchy and confluent nonspecific T2/FLAIR hyperintensities within the cerebral white matter and liliana most consistent with moderate chronic   microvascular ischemic change. Mild generalized brain parenchymal volume. There is relatively symmetric disproportionate atrophy of degenerative signal of the hippocampal formations with associated ex vacuo dilatation of the temporal horns of the lateral   ventricles. The ventricles are not enlarged out of proportion to the cerebral sulci. Normal position of the cerebellar tonsils.      SELLA: No abnormality accounting for technique.     OSSEOUS STRUCTURES/SOFT TISSUES: Normal marrow signal. The major intracranial vascular flow voids are maintained.      ORBITS: No abnormality accounting for technique.      SINUSES/MASTOIDS: No paranasal sinus mucosal disease. No middle ear or mastoid effusion.                                                                       IMPRESSION:  1.  Relatively symmetric disproportionate atrophy and degenerative T2 hyperintense signal of the hippocampal formations, suggestive of Alzheimer's disease in the appropriate clinical setting.  2.  Moderate chronic small vessel ischemic disease and mild generalized brain parenchymal volume loss.  3.  No acute intracranial pathology.       Office Visit on 02/21/2025   Component Date Value Ref Range Status    Sodium 02/21/2025 144  135 - 145 mmol/L Final    Potassium 02/21/2025 3.3 (L)  3.4 - 5.3 mmol/L Final    Carbon Dioxide (CO2) 02/21/2025 25  22 - 29 mmol/L Final    Anion Gap 02/21/2025 14  7 - 15 mmol/L Final    Urea Nitrogen 02/21/2025 12.0  8.0 - 23.0 mg/dL Final    Creatinine 02/21/2025 0.61  0.51 - 0.95 mg/dL Final    GFR Estimate 02/21/2025 89  >60 mL/min/1.73m2 Final    eGFR calculated using 2021 CKD-EPI equation.    Calcium 02/21/2025  9.3  8.8 - 10.4 mg/dL Final    Chloride 02/21/2025 105  98 - 107 mmol/L Final    Glucose 02/21/2025 102 (H)  70 - 99 mg/dL Final    Alkaline Phosphatase 02/21/2025 74  40 - 150 U/L Final    AST 02/21/2025 30  0 - 45 U/L Final    ALT 02/21/2025 21  0 - 50 U/L Final    Protein Total 02/21/2025 7.1  6.4 - 8.3 g/dL Final    Albumin 02/21/2025 4.1  3.5 - 5.2 g/dL Final    Bilirubin Total 02/21/2025 0.7  <=1.2 mg/dL Final    TSH 02/21/2025 0.80  0.30 - 4.20 uIU/mL Final    Vitamin B12 02/21/2025 390  232 - 1,245 pg/mL Final    Folic Acid 02/21/2025 10.4  4.6 - 34.8 ng/mL Final    Vitamin D, Total (25-Hydroxy) 02/21/2025 12 (L)  20 - 50 ng/mL Final    mild to moderate deficiency    WBC Count 02/21/2025 6.2  4.0 - 11.0 10e3/uL Final    RBC Count 02/21/2025 4.18  3.80 - 5.20 10e6/uL Final    Hemoglobin 02/21/2025 12.7  11.7 - 15.7 g/dL Final    Hematocrit 02/21/2025 39.3  35.0 - 47.0 % Final    MCV 02/21/2025 94  78 - 100 fL Final    MCH 02/21/2025 30.4  26.5 - 33.0 pg Final    MCHC 02/21/2025 32.3  31.5 - 36.5 g/dL Final    RDW 02/21/2025 12.3  10.0 - 15.0 % Final    Platelet Count 02/21/2025 229  150 - 450 10e3/uL Final    % Neutrophils 02/21/2025 72  % Final    % Lymphocytes 02/21/2025 20  % Final    % Monocytes 02/21/2025 6  % Final    % Eosinophils 02/21/2025 2  % Final    % Basophils 02/21/2025 1  % Final    % Immature Granulocytes 02/21/2025 0  % Final    Absolute Neutrophils 02/21/2025 4.4  1.6 - 8.3 10e3/uL Final    Absolute Lymphocytes 02/21/2025 1.2  0.8 - 5.3 10e3/uL Final    Absolute Monocytes 02/21/2025 0.4  0.0 - 1.3 10e3/uL Final    Absolute Eosinophils 02/21/2025 0.1  0.0 - 0.7 10e3/uL Final    Absolute Basophils 02/21/2025 0.0  0.0 - 0.2 10e3/uL Final    Absolute Immature Granulocytes 02/21/2025 0.0  <=0.4 10e3/uL Final         Laboratory data and imaging listed below was reviewed prior to this encounter.             Consent was obtained from the patient to use an AI documentation tool in the creation  of  this note

## 2025-06-18 ENCOUNTER — TELEPHONE (OUTPATIENT)
Dept: FAMILY MEDICINE | Facility: CLINIC | Age: 80
End: 2025-06-18
Payer: COMMERCIAL

## 2025-06-18 NOTE — TELEPHONE ENCOUNTER
Patient's daughter, Mo, called stating that at the last visit 3/11/25, there was a Handicap Placard form filled but they have never received the placard.  The daughter wants another one filled- she wants to pick it up so she can take it to [facility] herself.  Please advise if able to complete form- if so, please bring to CC for us to notify patient to .  Thank you